# Patient Record
Sex: FEMALE | Race: WHITE | NOT HISPANIC OR LATINO | Employment: UNEMPLOYED | URBAN - METROPOLITAN AREA
[De-identification: names, ages, dates, MRNs, and addresses within clinical notes are randomized per-mention and may not be internally consistent; named-entity substitution may affect disease eponyms.]

---

## 2022-11-10 ENCOUNTER — OFFICE VISIT (OUTPATIENT)
Dept: FAMILY MEDICINE CLINIC | Facility: CLINIC | Age: 22
End: 2022-11-10

## 2022-11-10 VITALS
OXYGEN SATURATION: 99 % | SYSTOLIC BLOOD PRESSURE: 118 MMHG | BODY MASS INDEX: 35.62 KG/M2 | HEIGHT: 66 IN | WEIGHT: 221.6 LBS | DIASTOLIC BLOOD PRESSURE: 74 MMHG | HEART RATE: 88 BPM

## 2022-11-10 DIAGNOSIS — M54.9 MID BACK PAIN, CHRONIC: Primary | ICD-10-CM

## 2022-11-10 DIAGNOSIS — G89.29 MID BACK PAIN, CHRONIC: Primary | ICD-10-CM

## 2022-11-10 PROBLEM — F90.9 ADHD (ATTENTION DEFICIT HYPERACTIVITY DISORDER): Status: ACTIVE | Noted: 2022-11-10

## 2022-11-10 PROBLEM — F43.10 PTSD (POST-TRAUMATIC STRESS DISORDER): Status: ACTIVE | Noted: 2019-09-12

## 2022-11-10 PROBLEM — Z84.1 FAMILY HISTORY OF RENAL FAILURE: Status: ACTIVE | Noted: 2019-10-15

## 2022-11-10 PROBLEM — Z83.3 FAMILY HISTORY OF DIABETES MELLITUS (DM): Status: ACTIVE | Noted: 2019-09-12

## 2022-11-10 PROCEDURE — 99203 OFFICE O/P NEW LOW 30 MIN: CPT | Performed by: NURSE PRACTITIONER

## 2022-11-10 RX ORDER — METHOCARBAMOL 500 MG/1
500 TABLET, FILM COATED ORAL 4 TIMES DAILY
Qty: 30 TABLET | Refills: 0 | Status: SHIPPED | OUTPATIENT
Start: 2022-11-10 | End: 2023-02-01

## 2022-11-10 NOTE — PROGRESS NOTES
"Chief Complaint  Back Pain and Breast Pain (Pt states she is having pain in both of her breast and she believes that's whats causing her back pain)    Subjective        Kyara Christianson presents to [facility]  History of Present Illness   Pt is here today with concerns of back pain that has been a concern for a couple of years. She states her breasts have gotten larger and she feels that is causing her pain. She is a 44DDD currently, but feels she needs to go to larger size bra. The pain is in the mid upper back between the shoulder blades. 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 She is experiencing some breast pain. She had a breast exam a couple of months ago and no abnormalities were found. Pt states she has considered a breast reduction. We discussed PT to start. She will follow up in 3 months and we will re-evaluate.   Back Pain  This is a chronic problem. The current episode started more than 1 year ago. The problem occurs constantly. The problem has been gradually worsening since onset. The quality of the pain is described as aching and cramping. The pain does not radiate. The pain is worse during the night. Exacerbated by: lifting. She has tried heat (massage unit) for the symptoms. The treatment provided mild relief.   Breast Pain  This is a new problem. The current episode started more than 1 month ago. The problem occurs intermittently. Nothing aggravates the symptoms. She has tried heat for the symptoms.       Objective   Vital Signs:  /74   Pulse 88   Ht 167.6 cm (66\")   Wt 101 kg (221 lb 9.6 oz)   SpO2 99%   BMI 35.77 kg/m²   Estimated body mass index is 35.77 kg/m² as calculated from the following:    Height as of this encounter: 167.6 cm (66\").    Weight as of this encounter: 101 kg (221 lb 9.6 oz).          Physical Exam  Vitals reviewed.   Constitutional:       Appearance: Normal appearance.   HENT:      Nose: Nose " normal.      Mouth/Throat:      Mouth: Mucous membranes are moist.      Pharynx: Oropharynx is clear.   Eyes:      Conjunctiva/sclera: Conjunctivae normal.   Cardiovascular:      Rate and Rhythm: Normal rate and regular rhythm.      Heart sounds: Normal heart sounds.   Pulmonary:      Effort: Pulmonary effort is normal.      Breath sounds: Normal breath sounds.   Musculoskeletal:         General: Tenderness present. Normal range of motion.      Thoracic back: Tenderness present. Normal range of motion.   Neurological:      Mental Status: She is alert and oriented to person, place, and time.   Psychiatric:         Mood and Affect: Mood normal.         Behavior: Behavior normal.         Thought Content: Thought content normal.        Result Review :                Assessment and Plan   Diagnoses and all orders for this visit:    1. Mid back pain, chronic (Primary)  -     methocarbamol (Robaxin) 500 MG tablet; Take 1 tablet by mouth 4 (Four) Times a Day.  Dispense: 30 tablet; Refill: 0  -     Ambulatory Referral to Physical Therapy Evaluate and treat             Follow Up   Return in about 3 months (around 2/10/2023) for Recheck, Annual.  Patient was given instructions and counseling regarding her condition or for health maintenance advice. Please see specific information pulled into the AVS if appropriate.

## 2023-01-24 ENCOUNTER — HOSPITAL ENCOUNTER (OUTPATIENT)
Dept: PHYSICAL THERAPY | Facility: HOSPITAL | Age: 23
Setting detail: THERAPIES SERIES
Discharge: HOME | End: 2023-01-24
Payer: COMMERCIAL

## 2023-01-24 DIAGNOSIS — G89.29 CHRONIC BILATERAL THORACIC BACK PAIN: Primary | ICD-10-CM

## 2023-01-24 DIAGNOSIS — M54.6 CHRONIC BILATERAL THORACIC BACK PAIN: Primary | ICD-10-CM

## 2023-01-24 PROCEDURE — 97161 PT EVAL LOW COMPLEX 20 MIN: CPT | Performed by: GENERAL ACUTE CARE HOSPITAL

## 2023-01-24 NOTE — THERAPY EVALUATION
Outpatient Physical Therapy Ortho Initial Evaluation  [facility]     Patient Name: Kyara Christianson  : 2000  MRN: [id number]  Today's Date: 2023      Visit Date: 2023    Patient Active Problem List   Diagnosis   • ADHD (attention deficit hyperactivity disorder)   • Asthma   • Family history of diabetes mellitus (DM)   • Family history of renal failure   • Overweight   • PTSD (post-traumatic stress disorder)        No past medical history on file.     No past surgical history on file.    Visit Dx:     ICD-10-CM ICD-9-CM   1. Chronic bilateral thoracic back pain  M54.6 724.1    G89.29 338.29          Patient History     Row Name 23 1345             History    Chief Complaint Difficulty with daily activities;Joint stiffness;Muscle weakness;Pain  -HP      Type of Pain Back pain  -HP      Date Current Problem(s) Began --  Chronic in nature  -HP      Brief Description of Current Complaint Patient states that she has had chronic in nature low back and mid back pain.  She attributes this pain to her increased sized breast.  For this reason, patient is seeking a breast reduction which requires physical therapy in order to receive approval by her insurance.  -HP      Patient/Caregiver Goals Relieve pain;Return to prior level of function;Improve mobility;Know what to do to help the symptoms  -HP      Hand Dominance right-handed  -HP      Occupation/sports/leisure activities  employee  -HP         Pain     Pain Location Back  -HP      Pain at Present 7  -HP      Pain at Best 7  -HP      Pain at Worst 10  -HP      Pain Frequency Constant/continuous  -HP      Pain Description Aching;Cramping;Discomfort;Sharp;Shooting;Tightness  -HP      Is your sleep disturbed? Yes  -HP      Difficulties at work? yes  -HP      Difficulties with ADL's? yes  -HP      Difficulties with recreational activities? yes  -HP         Fall Risk Assessment    Any falls in the past year: No  -HP         Daily Activities     Primary Language English  -      Pt Participated in POC and Goals Yes  -HP            User Key  (r) = Recorded By, (t) = Taken By, (c) = Cosigned By    Initials Name Provider Type    Mike Persaud PT Physical Therapist                 PT Ortho     Row Name 01/24/23 7351       Precautions and Contraindications    Precautions/Limitations no known precautions/limitations  -HP       Posture/Observations    Alignment Options Rounded shoulders  -HP    Rounded Shoulders Moderate  -HP    Posture/Observations Comments Patient with moderate TTP along the parascapular musculature and thoracic region musculature  -       Quarter Clearing    Quarter Clearing Upper Quarter Clearing  -       Sensory Screen for Light Touch- Upper Quarter Clearing    C4 (posterior shoulder) Intact  -HP    C5 (lateral upper arm) Intact  -HP    C6 (tip of thumb) Intact  -HP    C7 (tip of 3rd finger) Intact  -HP    C8 (tip of 5th finger) Intact  -HP    T1 (medial lower arm) Intact  -HP       General ROM    GENERAL ROM COMMENTS No active range of motion deficits noted in the upper extremities.  -HP       MMT (Manual Muscle Testing)    General MMT Comments WNL MMT of the upper extremities  -HP          User Key  (r) = Recorded By, (t) = Taken By, (c) = Cosigned By    Initials Name Provider Type    Mike Persaud PT Physical Therapist                            Therapy Education  Education Details: HEP included: Band pull-apart's, band no money, doorway stretch, side-lying open books  Given: HEP, Symptoms/condition management, Pain management, Posture/body mechanics  Program: New  How Provided: Verbal, Demonstration, Written  Provided to: Patient  Level of Understanding: Teach back education performed, Verbalized, Demonstrated      PT OP Goals     Row Name 01/24/23 1343          PT Short Term Goals    STG Date to Achieve 02/21/23  -     STG 1 Patient to report improvement of symptoms by 50% or greater.  -     STG 1 Progress New  -      STG 2 Patient to report adherence to HEP.  -HP     STG 2 Progress New  -HP     STG 3 Patient to report a 10% decrease in her modified Oswestry score compared to her initial evaluation.  -HP     STG 3 Progress New  -HP        Long Term Goals    LTG Date to Achieve 03/21/23  -HP     LTG 1 Patient to report improvement of symptoms by 75% or greater.  -HP     LTG 1 Progress New  -HP     LTG 2 Patient to report independence with HEP.  -HP     LTG 2 Progress New  -HP     LTG 3 Patient to report a 20% decrease in modified Oswestry score compared to her initial evaluation  -HP     LTG 3 Progress New  -HP        Time Calculation    PT Goal Re-Cert Due Date 04/24/23  -HP           User Key  (r) = Recorded By, (t) = Taken By, (c) = Cosigned By    Initials Name Provider Type    Mike Persaud, PT Physical Therapist                 PT Assessment/Plan     Row Name 01/24/23 5359          PT Assessment    Functional Limitations Performance in leisure activities;Performance in work activities  -     Impairments Pain;Range of motion;Poor body mechanics;Posture  -     Assessment Comments Patient is a 22-year-old female that presents with a low complex and evolving case of chronic mid back and low back pain.  Patient states that for several years she has had increased pain which she attributes to her breast size.  For this reason, patient is seeking a breast reduction.  Upon evaluation, patient has noticeable tenderness to palpation along the parascapular and paravertebral musculature of the thoracic spine.  Skilled therapy is required in order to address areas of weakness and improve patient's overall functional mobility.  -HP     Please refer to paper survey for additional self-reported information Yes  -HP     Rehab Potential Good  -HP     Patient/caregiver participated in establishment of treatment plan and goals Yes  -HP     Patient would benefit from skilled therapy intervention Yes  -HP        PT Plan    PT Frequency  1x/week  -     Predicted Duration of Therapy Intervention (PT) 6 visits  -     Planned CPT's? PT EVAL LOW COMPLEXITY: 20135;PT THER PROC EA 15 MIN: 63499;PT THER ACT EA 15 MIN: 80705;PT MANUAL THERAPY EA 15 MIN: 05297  -     Physical Therapy Interventions (Optional Details) gross motor skills;home exercise program;manual therapy techniques;modalities;neuromuscular re-education;patient/family education;postural re-education;ROM (Range of Motion);strengthening;stretching  -     PT Plan Comments Patient to benefit from further skilled therapy with a focus on improving overall postural strength, decreasing pain, and improving overall functional ability.  -           User Key  (r) = Recorded By, (t) = Taken By, (c) = Cosigned By    Initials Name Provider Type     Mike Daly, PT Physical Therapist                                              Time Calculation:     Start Time: 1345  Untimed Charges  PT Eval/Re-eval Minutes: 60  Total Minutes  Untimed Charges Total Minutes: 60   Total Minutes: 60     Therapy Charges for Today     Code Description Service Date Service Provider Modifiers Qty    [id number]  PT EVAL LOW COMPLEXITY 4 1/24/2023 Mike Daly, PT GP 1                    Mike Daly PT  1/24/2023

## 2023-01-30 ENCOUNTER — OFFICE VISIT (OUTPATIENT)
Dept: FAMILY MEDICINE CLINIC | Facility: CLINIC | Age: 23
End: 2023-01-30
Payer: COMMERCIAL

## 2023-01-30 VITALS
SYSTOLIC BLOOD PRESSURE: 122 MMHG | DIASTOLIC BLOOD PRESSURE: 80 MMHG | BODY MASS INDEX: 36.19 KG/M2 | HEART RATE: 95 BPM | HEIGHT: 66 IN | OXYGEN SATURATION: 98 % | WEIGHT: 225.2 LBS

## 2023-01-30 DIAGNOSIS — M25.532 LEFT WRIST PAIN: Primary | ICD-10-CM

## 2023-01-30 PROCEDURE — 99213 OFFICE O/P EST LOW 20 MIN: CPT | Performed by: NURSE PRACTITIONER
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
## 2023-02-01 ENCOUNTER — OFFICE VISIT (OUTPATIENT)
Dept: FAMILY MEDICINE CLINIC | Facility: CLINIC | Age: 23
End: 2023-02-01
Payer: COMMERCIAL

## 2023-02-01 VITALS
OXYGEN SATURATION: 98 % | DIASTOLIC BLOOD PRESSURE: 64 MMHG | RESPIRATION RATE: 14 BRPM | TEMPERATURE: 98.7 F | HEART RATE: 107 BPM | BODY MASS INDEX: 36.42 KG/M2 | WEIGHT: 226.6 LBS | SYSTOLIC BLOOD PRESSURE: 118 MMHG | HEIGHT: 66 IN

## 2023-02-01 DIAGNOSIS — M25.532 LEFT WRIST PAIN: ICD-10-CM

## 2023-02-01 DIAGNOSIS — M54.9 CHRONIC BACK PAIN, UNSPECIFIED BACK LOCATION, UNSPECIFIED BACK PAIN LATERALITY: Primary | ICD-10-CM

## 2023-02-01 DIAGNOSIS — G89.29 CHRONIC BACK PAIN, UNSPECIFIED BACK LOCATION, UNSPECIFIED BACK PAIN LATERALITY: Primary | ICD-10-CM

## 2023-02-01 PROCEDURE — 99214 OFFICE O/P EST MOD 30 MIN: CPT | Performed by: STUDENT IN AN ORGANIZED HEALTH CARE EDUCATION/TRAINING PROGRAM

## 2023-02-01 NOTE — PROGRESS NOTES
New Patient Office Visit      Patient Name: Kyara Christianson  : 2000   MRN: [id number]     Chief Complaint:  Pain (Left wrist, est care)     History of Present Illness:       Left wrist pain  Started 2.5 weeks ago.   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 She has some numbness in her left hand. No burning or tingling. She says the symptoms stop at the knuckles.   No rash.         Back pain  She has been to physical therapy and plans to continue pt.   This has been going on for years.    The pain is in her thoracic spine .   She feels large breasts are contributing  She has tried a back brace for this as well.   No numbness tingling or burning in her back but she says it is a sharp pain that is worse with sitting and slumping over.       Subjective        Past Medical History:   Diagnosis Date   • Asthma     All my childhood.       History reviewed. No pertinent surgical history.    Family History   Problem Relation Age of Onset   • Hypertension Mother    • Mental illness Mother    • Diabetes Mother    • Kidney failure Mother    • Depression Mother    • Drug abuse Mother    • Migraines Sister        Social History     Socioeconomic History   • Marital status:    Tobacco Use   • Smoking status: Never   • Smokeless tobacco: Never   Substance and Sexual Activity   • Alcohol use: Yes     Alcohol/week: 1.0 standard drink     Types: 1 Glasses of wine per week   • Drug use: Never   • Sexual activity: Yes     Partners: Female     Birth control/protection: Same-sex partner        No current outpatient medications on file.    No Known Allergies    Objective     Physical Exam:  Vitals:    23 1351   BP: 118/64   Pulse: 107   Resp: 14   Temp: 98.7 °F  "(37.1 °C)   SpO2: 98%   Weight: 103 kg (226 lb 9.6 oz)   Height: 167.6 cm (65.98\")   PainSc:   7      Body mass index is 36.6 kg/m².     Physical Exam  Constitutional:       General: She is not in acute distress.     Appearance: Normal appearance.   HENT:      Head: Normocephalic and atraumatic.   Eyes:      Extraocular Movements: Extraocular movements intact.   Cardiovascular:      Rate and Rhythm: Normal rate and regular rhythm.      Heart sounds: No murmur heard.  Pulmonary:      Effort: Pulmonary effort is normal. No respiratory distress.      Breath sounds: Normal breath sounds. No stridor. No wheezing, rhonchi or rales.   Musculoskeletal:      Comments: Pain present paraspinal muscles to the right of thoracic spine.  Normal gait  Upper extremities have normal  strength rom muscle strength sensations and pulses  phalens test is positive on the left     Skin:     Findings: No rash.   Neurological:      General: No focal deficit present.      Mental Status: She is alert.   Psychiatric:         Mood and Affect: Mood normal.              Assessment / Plan      Assessment/Plan:   Diagnoses and all orders for this visit:    1. Chronic back pain, unspecified back location, unspecified back pain laterality (Primary)  -     XR Spine Thoracic 3 View (In Office)    2. Left wrist pain  -     XR Wrist 3+ View Left (In Office)       Continue with ibuprofen and pt for chronic back pain. She will consider breast reduction after physical therapy.     For left wrist pain will refer her to orthopedics. Nerve conduction study is ordered per her previous pcp and she has this scheduled. Advised her to let me know when this has resulted and I will look at the result. Will check xray here in office. Continue with rest ice and ibuprofen.         Return in about 2 months (around 4/1/2023) for Annual.       Janice Rasheed D.O.  [facility] [place]     "

## 2023-02-06 ENCOUNTER — OFFICE VISIT (OUTPATIENT)
Dept: ORTHOPEDIC SURGERY | Facility: CLINIC | Age: 23
End: 2023-02-06
Payer: COMMERCIAL

## 2023-02-06 VITALS
SYSTOLIC BLOOD PRESSURE: 120 MMHG | HEIGHT: 66 IN | WEIGHT: 227.07 LBS | BODY MASS INDEX: 36.49 KG/M2 | DIASTOLIC BLOOD PRESSURE: 68 MMHG

## 2023-02-06 DIAGNOSIS — M65.831 EXTENSOR INTERSECTION SYNDROME OF RIGHT WRIST: Primary | ICD-10-CM

## 2023-02-06 DIAGNOSIS — E66.9 OBESITY (BMI 30-39.9): ICD-10-CM

## 2023-02-06 DIAGNOSIS — G56.01 CARPAL TUNNEL SYNDROME OF RIGHT WRIST: ICD-10-CM

## 2023-02-06 PROCEDURE — 99204 OFFICE O/P NEW MOD 45 MIN: CPT | Performed by: STUDENT IN AN ORGANIZED HEALTH CARE EDUCATION/TRAINING PROGRAM

## 2023-02-06 RX ORDER — METHYLPREDNISOLONE 4 MG/1
TABLET ORAL
Qty: 21 TABLET | Refills: 0 | Status: SHIPPED | OUTPATIENT
Start: 2023-02-06

## 2023-02-06 NOTE — PROGRESS NOTES
[facility] [place] [facility] Office Visit     Office Visit       Date: 02/06/2023   Patient Name: Kyara Christianson  MRN: [id number]  YOB: 2000    Referring Physician: Janice Rasheed DO     Chief Complaint:   Chief Complaint   Patient presents with   • Left Wrist - Pain       History of Present Illness:   Kyara Christianson is a 22 y.o. female who presents with new problem of: left wrist pain.  Onset: atraumatic and gradual in nature. The issue has been ongoing for 3 year(s). Pain is a 7/10 on the pain scale. Pain is described as throbbing and shooting. Associated symptoms include pain and giving way/buckling. The pain is worse with any movement of the joint; heat and pain medication and/or NSAID improve the pain. Previous treatments have included: bracing and NSAIDS.    Subjective   Review of Systems: Review of Systems   Constitutional: Negative for chills, fever, unexpected weight gain and unexpected weight loss.   HENT: Negative for congestion, postnasal drip and rhinorrhea.    Eyes: Negative for blurred vision.   Respiratory: Negative for shortness of breath.    Cardiovascular: Negative for leg swelling.   Gastrointestinal: Negative for abdominal pain, nausea and vomiting.   Genitourinary: Negative for difficulty urinating.   Musculoskeletal: Positive for arthralgias. Negative for gait problem, joint swelling and myalgias.   Skin: Negative for skin lesions and wound.   Neurological: Negative for dizziness, weakness, light-headedness and numbness.   Hematological: Does not bruise/bleed easily.   Psychiatric/Behavioral: Negative for depressed mood.        I have reviewed the following portions of the patient's history:History of Present Illness and review of systems.    Past Medical History:   Past Medical History:   Diagnosis Date   • Arthritis of back    • Asthma     All my childhood.   • CTS (carpal tunnel syndrome)     Possibly?   • Fracture of wrist        Past  "Surgical History: No past surgical history on file.    Family History:   Family History   Problem Relation Age of Onset   • Hypertension Mother    • Mental illness Mother    • Diabetes Mother    • Kidney failure Mother    • Depression Mother    • Drug abuse Mother    • Migraines Sister        Social History:   Social History     Socioeconomic History   • Marital status:    Tobacco Use   • Smoking status: Never   • Smokeless tobacco: Never   Substance and Sexual Activity   • Alcohol use: Yes   • Drug use: Never   • Sexual activity: Yes     Partners: Female     Birth control/protection: Same-sex partner       Medications:   Current Outpatient Medications:   •  methylPREDNISolone (MEDROL) 4 MG dose pack, Take as directed on package instructions., Disp: 21 tablet, Rfl: 0    Allergies: No Known Allergies    I reviewed the patient's chief complaint, history of present illness, review of systems, past medical history, surgical history, family history, social history, medications and allergy list.     Objective    Vital Signs:   Vitals:    02/06/23 1023   BP: 120/68   Weight: 103 kg (227 lb 1.2 oz)   Height: 167.6 cm (65.98\")     Body mass index is 36.67 kg/m².   Class 2 Severe Obesity (BMI >=35 and <=39.9). Obesity-related health conditions include the following: hypertension, coronary heart disease, diabetes mellitus and dyslipidemias. Obesity is newly identified. BMI is is above average; BMI management plan is completed. We discussed portion control and increasing exercise.     Patient reports that she is a non-smoker and has not ever been a smoker.  This behavior was applauded and she was encouraged to continue in smoking cessation.  We will continue to monitor at subsequent visits.    Ortho Exam:  General: Well-appearing, no acute distress  Right wrist: Tender over the dorsum of the right wrist.  No swelling or bruising.  Worsened by wrist extension.  Tender over intersection of 1st and 2nd dorsal compartments.  " Normal range of motion but with pain.  Negative Finkelstein test.  2+ radial pulse.  Sensation intact to light touch.  right hand:  Normal motor exam - no wasting of thenar eminence, normal pincer grasp. Normal sensation.   + tinel sign. + phalen sign. + compression test.     Results Review:   Imaging Results (Last 24 Hours)     ** No results found for the last 24 hours. **      XR Wrist 3+ View Left (In Office) (02/01/2023 14:18)  I personally reviewed radiographs of the left wrist.  No acute fracture or dislocation.  No significant degenerative change noted.    Procedures    Assessment / Plan    Assessment/Plan:   Diagnoses and all orders for this visit:    1. 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 Carpal tunnel syndrome of right wrist  -     methylPREDNISolone (MEDROL) 4 MG dose pack; Take as directed on package instructions.  Dispense: 21 tablet; Refill: 0  -     Ambulatory Referral to Physical Therapy Evaluate and treat, Ortho; Electrotherapy; E-stim, Iontophoresis; Soft Tissue Mobilizaton; Stretching, ROM, Strengthening    3. Obesity (BMI 30-39.9)      3 years of intermittent but now worsening right wrist pain.  She is tender in multiple locations.  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  Has tenderness over her extensor tendons of the dorsal compartment.  Radiographs negative.  History points towards overuse injury from intersection syndrome.  However, she also has volar aspect numbness and tingling in addition to mild pain through the distribution of the median nerve.  I discussed with her my findings of both intersection syndrome and carpal tunnel.  She has been in a brace that think is appropriate.  I would have her continue wearing this.  She is scheduled in the coming weeks for EMG for further  evaluation of the median nerve pathology.  I will start her today on a Medrol Dosepak to control her pain.  Also get her into hand therapy for additional treatments.  Plan to see her back after the EMG to discuss results and the next Epson treatment.    Past documentation reviewed: NAZ Tolbert-1/30/2023-office visit.  Janice Rasheed DO-office visit-2/1/2023.    Past laboratory results review: 9/28/2020-hemoglobin A1c 5.0%.    Past imaging studies reviewed: 3 views of the left wrist-2/1/2023.    Follow Up:   No follow-ups on file.      Parveen Mora MD  [facility]

## 2023-02-28 ENCOUNTER — DOCUMENTATION (OUTPATIENT)
Dept: PHYSICAL THERAPY | Facility: HOSPITAL | Age: 23
End: 2023-02-28
Payer: COMMERCIAL

## 2023-02-28 DIAGNOSIS — M54.6 CHRONIC BILATERAL THORACIC BACK PAIN: Primary | ICD-10-CM

## 2023-02-28 DIAGNOSIS — G89.29 CHRONIC BILATERAL THORACIC BACK PAIN: Primary | ICD-10-CM

## 2023-02-28 NOTE — THERAPY DISCHARGE NOTE
Outpatient Physical Therapy Discharge Summary         Patient Name: Kyara Christianson  : 2000  MRN: [id number]    Today's Date: 2023    Visit Dx:    ICD-10-CM ICD-9-CM   1. Chronic bilateral thoracic back pain  M54.6 724.1    G89.29 338.29           OP PT Discharge Summary  Date of Discharge: 23  Discharge Instructions/Additional Comments: Patient did not show for 3 visits and did not call to reschedule.  For this reason and per clinic's policy patient to be discharged at this time.      Time Calculation:                    Mike Daly, PT  2023

## 2024-03-15 ENCOUNTER — APPOINTMENT (OUTPATIENT)
Dept: CT IMAGING | Facility: HOSPITAL | Age: 24
End: 2024-03-15
Payer: COMMERCIAL

## 2024-03-15 ENCOUNTER — HOSPITAL ENCOUNTER (EMERGENCY)
Facility: HOSPITAL | Age: 24
Discharge: HOME | End: 2024-03-15
Attending: EMERGENCY MEDICINE
Payer: COMMERCIAL

## 2024-03-15 ENCOUNTER — APPOINTMENT (OUTPATIENT)
Dept: GENERAL RADIOLOGY | Facility: HOSPITAL | Age: 24
End: 2024-03-15
Payer: COMMERCIAL

## 2024-03-15 VITALS
RESPIRATION RATE: 18 BRPM | DIASTOLIC BLOOD PRESSURE: 61 MMHG | BODY MASS INDEX: 36.16 KG/M2 | SYSTOLIC BLOOD PRESSURE: 110 MMHG | OXYGEN SATURATION: 95 % | WEIGHT: 225 LBS | HEIGHT: 66 IN | HEART RATE: 74 BPM | TEMPERATURE: 98 F
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
## 2024-03-15 DIAGNOSIS — M54.50 ACUTE BILATERAL LOW BACK PAIN WITHOUT SCIATICA: ICD-10-CM

## 2024-03-15 DIAGNOSIS — S16.1XXA STRAIN OF NECK MUSCLE, INITIAL ENCOUNTER: ICD-10-CM

## 2024-03-15 LAB
ALBUMIN SERPL-MCNC: 4.2 G/DL (ref 3.5–5.2)
ALBUMIN/GLOB SERPL: 1.4 G/DL
ALP SERPL-CCNC: 58 U/L (ref 39–117)
ALT SERPL W P-5'-P-CCNC: 15 U/L (ref 1–33)
ANION GAP SERPL CALCULATED.3IONS-SCNC: 12.3 MMOL/L (ref 5–15)
AST SERPL-CCNC: 17 U/L (ref 1–32)
BASOPHILS # BLD AUTO: 0.08 10*3/MM3 (ref 0–0.2)
BASOPHILS NFR BLD AUTO: 1 % (ref 0–1.5)
BILIRUB SERPL-MCNC: 0.4 MG/DL (ref 0–1.2)
BUN SERPL-MCNC: 11 MG/DL (ref 6–20)
BUN/CREAT SERPL: 14.1 (ref 7–25)
CALCIUM SPEC-SCNC: 8.7 MG/DL (ref 8.6–10.5)
CHLORIDE SERPL-SCNC: 105 MMOL/L (ref 98–107)
CO2 SERPL-SCNC: 21.7 MMOL/L (ref 22–29)
CREAT SERPL-MCNC: 0.78 MG/DL (ref 0.57–1)
DEPRECATED RDW RBC AUTO: 38.6 FL (ref 37–54)
EGFRCR SERPLBLD CKD-EPI 2021: 110.3 ML/MIN/1.73
EOSINOPHIL # BLD AUTO: 0.12 10*3/MM3 (ref 0–0.4)
EOSINOPHIL NFR BLD AUTO: 1.5 % (ref 0.3–6.2)
ERYTHROCYTE [DISTWIDTH] IN BLOOD BY AUTOMATED COUNT: 12 % (ref 12.3–15.4)
GLOBULIN UR ELPH-MCNC: 3 GM/DL
GLUCOSE SERPL-MCNC: 98 MG/DL (ref 65–99)
HCG INTACT+B SERPL-ACNC: <0.5 MIU/ML
HCT VFR BLD AUTO: 40.7 % (ref 34–46.6)
HGB BLD-MCNC: 14.3 G/DL (ref 12–15.9)
IMM GRANULOCYTES # BLD AUTO: 0.03 10*3/MM3 (ref 0–0.05)
IMM GRANULOCYTES NFR BLD AUTO: 0.4 % (ref 0–0.5)
LYMPHOCYTES # BLD AUTO: 3.2 10*3/MM3 (ref 0.7–3.1)
LYMPHOCYTES NFR BLD AUTO: 40 % (ref 19.6–45.3)
MCH RBC QN AUTO: 30.8 PG (ref 26.6–33)
MCHC RBC AUTO-ENTMCNC: 35.1 G/DL (ref 31.5–35.7)
MCV RBC AUTO: 87.5 FL (ref 79–97)
MONOCYTES # BLD AUTO: 0.54 10*3/MM3 (ref 0.1–0.9)
MONOCYTES NFR BLD AUTO: 6.7 % (ref 5–12)
NEUTROPHILS NFR BLD AUTO: 4.04 10*3/MM3 (ref 1.7–7)
NEUTROPHILS NFR BLD AUTO: 50.4 % (ref 42.7–76)
NRBC BLD AUTO-RTO: 0 /100 WBC (ref 0–0.2)
PLATELET # BLD AUTO: 302 10*3/MM3 (ref 140–450)
PMV BLD AUTO: 11.1 FL (ref 6–12)
POTASSIUM SERPL-SCNC: 3.3 MMOL/L (ref 3.5–5.2)
PROT SERPL-MCNC: 7.2 G/DL (ref 6–8.5)
QT INTERVAL: 392 MS
QTC INTERVAL: 426 MS
RBC # BLD AUTO: 4.65 10*6/MM3 (ref 3.77–5.28)
SODIUM SERPL-SCNC: 139 MMOL/L (ref 136–145)
WBC NRBC COR # BLD AUTO: 8.01 10*3/MM3 (ref 3.4–10.8)

## 2024-03-15 PROCEDURE — 96374 THER/PROPH/DIAG INJ IV PUSH: CPT

## 2024-03-15 PROCEDURE — 71260 CT THORAX DX C+: CPT

## 2024-03-15 PROCEDURE — 93010 ELECTROCARDIOGRAM REPORT: CPT | Performed by: INTERNAL MEDICINE

## 2024-03-15 PROCEDURE — 93005 ELECTROCARDIOGRAM TRACING: CPT | Performed by: EMERGENCY MEDICINE

## 2024-03-15 PROCEDURE — 25010000002 PROCHLORPERAZINE 10 MG/2ML SOLUTION: Performed by: EMERGENCY MEDICINE

## 2024-03-15 PROCEDURE — 74177 CT ABD & PELVIS W/CONTRAST: CPT

## 2024-03-15 PROCEDURE — 84702 CHORIONIC GONADOTROPIN TEST: CPT | Performed by: EMERGENCY MEDICINE

## 2024-03-15 PROCEDURE — 25010000002 HYDROMORPHONE 1 MG/ML SOLUTION: Performed by: EMERGENCY MEDICINE

## 2024-03-15 PROCEDURE — 99285 EMERGENCY DEPT VISIT HI MDM: CPT

## 2024-03-15 PROCEDURE — 72125 CT NECK SPINE W/O DYE: CPT

## 2024-03-15 PROCEDURE — 25510000001 IOPAMIDOL PER 1 ML: Performed by: EMERGENCY MEDICINE

## 2024-03-15 PROCEDURE — 93005 ELECTROCARDIOGRAM TRACING: CPT

## 2024-03-15 PROCEDURE — 85025 COMPLETE CBC W/AUTO DIFF WBC: CPT | Performed by: EMERGENCY MEDICINE

## 2024-03-15 PROCEDURE — 96375 TX/PRO/DX INJ NEW DRUG ADDON: CPT

## 2024-03-15 PROCEDURE — 25010000002 ONDANSETRON PER 1 MG: Performed by: EMERGENCY MEDICINE

## 2024-03-15 PROCEDURE — 36415 COLL VENOUS BLD VENIPUNCTURE: CPT

## 2024-03-15 PROCEDURE — 80053 COMPREHEN METABOLIC PANEL: CPT | Performed by: EMERGENCY MEDICINE

## 2024-03-15 RX ORDER — ONDANSETRON 2 MG/ML
4 INJECTION INTRAMUSCULAR; INTRAVENOUS ONCE
Status: COMPLETED | OUTPATIENT
Start: 2024-03-15 | End: 2024-03-15

## 2024-03-15 RX ORDER — PROCHLORPERAZINE EDISYLATE 5 MG/ML
10 INJECTION INTRAMUSCULAR; INTRAVENOUS ONCE
Status: COMPLETED | OUTPATIENT
Start: 2024-03-15 | End: 2024-03-15

## 2024-03-15 RX ADMIN — IOPAMIDOL 100 ML: 755 INJECTION, SOLUTION INTRAVENOUS at 09:44

## 2024-03-15 RX ADMIN — HYDROMORPHONE HYDROCHLORIDE 1 MG: 1 INJECTION, SOLUTION INTRAMUSCULAR; INTRAVENOUS; SUBCUTANEOUS at 09:14

## 2024-03-15 RX ADMIN — PROCHLORPERAZINE EDISYLATE 10 MG: 5 INJECTION INTRAMUSCULAR; INTRAVENOUS at 10:15

## 2024-03-15 RX ADMIN — ONDANSETRON 4 MG: 2 INJECTION INTRAMUSCULAR; INTRAVENOUS at 09:12

## 2024-03-15 NOTE — SIGNIFICANT NOTE
03/15/24 1154   Plan   Plan Comments SW sent referral to [facility] per provider request.
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
## 2024-04-05 ENCOUNTER — OFFICE VISIT (OUTPATIENT)
Dept: FAMILY MEDICINE CLINIC | Facility: CLINIC | Age: 24
End: 2024-04-05
Payer: COMMERCIAL

## 2024-04-05 ENCOUNTER — HOSPITAL ENCOUNTER (OUTPATIENT)
Dept: GENERAL RADIOLOGY | Facility: HOSPITAL | Age: 24
Discharge: HOME | End: 2024-04-05
Payer: COMMERCIAL

## 2024-04-05 VITALS
SYSTOLIC BLOOD PRESSURE: 127 MMHG | WEIGHT: 228.8 LBS | BODY MASS INDEX: 42.1 KG/M2 | OXYGEN SATURATION: 99 % | HEART RATE: 75 BPM | HEIGHT: 62 IN | DIASTOLIC BLOOD PRESSURE: 75 MMHG

## 2024-04-05 DIAGNOSIS — M25.511 ACUTE PAIN OF RIGHT SHOULDER: ICD-10-CM
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
## 2024-04-05 DIAGNOSIS — M25.511 ACUTE PAIN OF RIGHT SHOULDER: Primary | ICD-10-CM

## 2024-04-05 PROCEDURE — 73030 X-RAY EXAM OF SHOULDER: CPT

## 2024-04-05 PROCEDURE — 99204 OFFICE O/P NEW MOD 45 MIN: CPT

## 2024-04-05 RX ORDER — ALBUTEROL SULFATE 90 UG/1
2 AEROSOL, METERED RESPIRATORY (INHALATION) EVERY 6 HOURS PRN
COMMUNITY

## 2024-04-05 RX ORDER — METHOCARBAMOL 500 MG/1
500 TABLET, FILM COATED ORAL 4 TIMES DAILY
Qty: 120 TABLET | Refills: 0 | Status: SHIPPED | OUTPATIENT
Start: 2024-04-05

## 2024-04-05 RX ORDER — LIDOCAINE 50 MG/G
1 PATCH TOPICAL EVERY 24 HOURS
Qty: 30 EACH | Refills: 0 | Status: SHIPPED | OUTPATIENT
Start: 2024-04-05

## 2024-04-19 ENCOUNTER — OFFICE VISIT (OUTPATIENT)
Dept: FAMILY MEDICINE CLINIC | Facility: CLINIC | Age: 24
End: 2024-04-19
Payer: COMMERCIAL

## 2024-04-19 ENCOUNTER — PATIENT ROUNDING (BHMG ONLY) (OUTPATIENT)
Dept: FAMILY MEDICINE CLINIC | Facility: CLINIC | Age: 24
End: 2024-04-19

## 2024-04-19 VITALS
OXYGEN SATURATION: 100 % | SYSTOLIC BLOOD PRESSURE: 127 MMHG | WEIGHT: 226.8 LBS | HEIGHT: 62 IN | DIASTOLIC BLOOD PRESSURE: 76 MMHG | HEART RATE: 98 BPM | BODY MASS INDEX: 41.73 KG/M2

## 2024-04-19 DIAGNOSIS — Z00.00 ANNUAL PHYSICAL EXAM: Primary | ICD-10-CM

## 2024-04-19 DIAGNOSIS — Z13.220 SCREENING FOR LIPID DISORDERS: ICD-10-CM

## 2024-04-19 DIAGNOSIS — Z11.8 SCREENING FOR CHLAMYDIAL DISEASE: ICD-10-CM

## 2024-04-19 DIAGNOSIS — Z23 NEED FOR TDAP VACCINATION: ICD-10-CM

## 2024-04-19 DIAGNOSIS — Z11.59 NEED FOR HEPATITIS C SCREENING TEST: ICD-10-CM

## 2024-04-19 DIAGNOSIS — Z13.29 SCREENING FOR THYROID DISORDER: ICD-10-CM

## 2024-04-19 DIAGNOSIS — J45.20 MILD INTERMITTENT ASTHMA WITHOUT COMPLICATION: ICD-10-CM

## 2024-04-19 DIAGNOSIS — Z76.89 ENCOUNTER TO ESTABLISH CARE: ICD-10-CM

## 2024-04-19 DIAGNOSIS — J02.0 RECURRENT STREPTOCOCCAL PHARYNGITIS: ICD-10-CM

## 2024-04-19 RX ORDER — ALBUTEROL SULFATE 90 UG/1
2 AEROSOL, METERED RESPIRATORY (INHALATION) EVERY 6 HOURS PRN
Qty: 8 G | Refills: 2 | Status: SHIPPED | OUTPATIENT
Start: 2024-04-19

## 2024-04-19 NOTE — PROGRESS NOTES
Chief Complaint  Establish Care (/) and Annual Exam    SUBJECTIVE  Kyara Christianson presents to [facility]    History of Present Illness  Patient is a 23-year-old female who presents today to establish care.  Previous PCP unknown. She is due annual physical exam, to be done in office today.  Needs chronic condition management of asthma.     Asthma-reports she rarely has a flare.  Patient reports spring and summertime bothers her the most.  Patient is needing refill on her albuterol inhaler.  Denies any nighttime symptoms.    Patient is due for her first Pap smear.  She reports she will schedule with our office.    She is due TDAP and is agreeable to have done in office today.     Patient is due labs. Orders placed at today's visit. Discussed with patient that these are fasting labs.     No other concerns or complaints at this time.  Patient denies any diarrhea, constipation, blood in stool, urinary urgency, frequency, or dysuria, chest pain, shortness of breath or syncope.           Past Medical History:   Diagnosis Date    Arthritis of back     Asthma     All my childhood.    CTS (carpal tunnel syndrome)     Possibly?    Fracture of wrist       Family History   Problem Relation Age of Onset    Hypertension Mother     Mental illness Mother     Diabetes Mother     Kidney failure Mother     Depression Mother     Drug abuse Mother     Migraines Sister       History reviewed. No pertinent surgical history.     Current Outpatient Medications:     albuterol sulfate  (90 Base) MCG/ACT inhaler, Inhale 2 puffs Every 6 (Six) Hours As Needed for Wheezing or Shortness of Air., Disp: 8 g, Rfl: 2    diclofenac (VOLTAREN) 50 MG EC tablet, Take 1 tablet by mouth 2 (Two) Times a Day As Needed (pain)., Disp: 30 tablet, Rfl: 0    lidocaine (LIDODERM) 5 %, Place 1 patch on the skin as directed by provider Daily. Remove & Discard patch within 12 hours or as directed by MD, Disp: 30 each, Rfl:  "0    OBJECTIVE  Vital Signs:   /76   Pulse 98   Ht 157.5 cm (62\")   Wt 103 kg (226 lb 12.8 oz)   LMP 03/29/2024   SpO2 100%   BMI 41.48 kg/m²    Estimated body mass index is 41.48 kg/m² as calculated from the following:    Height as of this encounter: 157.5 cm (62\").    Weight as of this encounter: 103 kg (226 lb 12.8 oz).     Wt Readings from Last 3 Encounters:   04/19/24 103 kg (226 lb 12.8 oz)   04/05/24 104 kg (228 lb 12.8 oz)   03/15/24 102 kg (225 lb)     BP Readings from Last 3 Encounters:   04/19/24 127/76   04/05/24 127/75   03/15/24 110/61       Physical Exam  Vitals reviewed.   Constitutional:       General: She is awake. She is not in acute distress.     Appearance: She is well-developed and well-groomed. She is not ill-appearing.   HENT:      Head: Normocephalic and atraumatic.      Right Ear: Tympanic membrane, ear canal and external ear normal.      Left Ear: Tympanic membrane, ear canal and external ear normal.      Nose: Nose normal.      Mouth/Throat:      Mouth: Mucous membranes are moist.      Pharynx: Oropharynx is clear. No oropharyngeal exudate or posterior oropharyngeal erythema.   Eyes:      Extraocular Movements: Extraocular movements intact.      Conjunctiva/sclera: Conjunctivae normal.      Pupils: Pupils are equal, round, and reactive to light.   Neck:      Thyroid: No thyroid mass, thyromegaly or thyroid tenderness.   Cardiovascular:      Rate and Rhythm: Normal rate and regular rhythm.      Heart sounds: Normal heart sounds.   Pulmonary:      Effort: Pulmonary effort is normal.      Breath sounds: Normal breath sounds.   Abdominal:      General: Abdomen is flat. Bowel sounds are normal. There is no distension.      Palpations: Abdomen is soft.      Tenderness: There is no abdominal tenderness.   Musculoskeletal:         General: Normal range of motion.      Cervical back: Normal range of motion and neck supple. No rigidity or tenderness.   Lymphadenopathy:      Cervical: No " cervical adenopathy.   Skin:     General: Skin is warm and dry.   Neurological:      Mental Status: She is alert and oriented to person, place, and time.   Psychiatric:         Mood and Affect: Mood normal.         Behavior: Behavior normal. Behavior is cooperative.         Thought Content: Thought content normal.         Judgment: Judgment normal.          Result Review    Common labs          3/15/2024    09:15   Common Labs   Glucose 98    BUN 11    Creatinine 0.78    Sodium 139    Potassium 3.3    Chloride 105    Calcium 8.7    Albumin 4.2    Total Bilirubin 0.4    Alkaline Phosphatase 58    AST (SGOT) 17    ALT (SGPT) 15    WBC 8.01    Hemoglobin 14.3    Hematocrit 40.7    Platelets 302        XR Shoulder 2+ View Right    Result Date: 4/5/2024  Impression: Negative study   Electronically Signed By-Jonathon Mendez On:4/5/2024 8:26 AM      CT Abdomen Pelvis With Contrast    Result Date: 3/15/2024   Negative CT of the abdomen and pelvis     Sung Mcgregor MD       Electronically Signed and Approved By: Sung Mcgregor MD on 3/15/2024 at 9:57             CT Chest With Contrast Diagnostic    Result Date: 3/15/2024    1. Mild cardiomegaly 2. Small dependent pleural effusions 3. No fractures are identified.     Sung Mcgregor MD       Electronically Signed and Approved By: Sung Mcgregor MD on 3/15/2024 at 9:55             CT Cervical Spine Without Contrast    Result Date: 3/15/2024    1. Straightening of the normal cervical lordosis otherwise normal CT of the cervical spine.     Sung Mcgregor MD       Electronically Signed and Approved By: Sung Mcgregor MD on 3/15/2024 at 9:51                 The above data has been reviewed by NAZ Yanez 04/19/2024 14:15 EDT.          Patient Care Team:  Luann Gallego APRN as PCP - General (Nurse Practitioner)           ASSESSMENT & PLAN    Diagnoses and all orders for this visit:    1. Annual physical exam (Primary)  Comments:  preventative counseling  healthy  diet  daily exercise  get adequate sleep  Orders:  -     Comprehensive Metabolic Panel; Future  -     CBC & Differential; Future  -     Lipid Panel; Future  -     TSH+Free T4; Future  -     Hepatitis C antibody; Future  -     Chlamydia trachomatis, Neisseria gonorrhoeae, PCR - Urine, Urine, Clean Catch; Future    2. Screening for chlamydial disease  -     Chlamydia trachomatis, Neisseria gonorrhoeae, PCR - Urine, Urine, Clean Catch; Future    3. Encounter to establish care  Comments:  medical hx and medications reviewed    4. Screening for lipid disorders  -     Lipid Panel; Future    5. Screening for thyroid disorder  -     TSH+Free T4; Future    6. Need for hepatitis C screening test  -     Hepatitis C antibody; Future    7. Need for Tdap vaccination  Comments:  given on office today  Orders:  -     Tdap Vaccine => 8yo IM (BOOSTRIX)    8. Recurrent streptococcal pharyngitis  Comments:  ref to ent for evaluation  Orders:  -     Ambulatory Referral to ENT (Otolaryngology)    9. Mild intermittent asthma without complication  Comments:  stable, refilled albuterol  Overview:  Formatting of this note might be different from the original.  - last sx summer 2018  - sx usually season now  - was persistent in past, hx hospitalizations    Orders:  -     albuterol sulfate  (90 Base) MCG/ACT inhaler; Inhale 2 puffs Every 6 (Six) Hours As Needed for Wheezing or Shortness of Air.  Dispense: 8 g; Refill: 2         Tobacco Use: Low Risk  (4/19/2024)    Patient History     Smoking Tobacco Use: Never     Smokeless Tobacco Use: Never     Passive Exposure: Never       Follow Up     Return in about 6 months (around 10/19/2024) for Next scheduled follow up.      Patient was given instructions and counseling regarding her condition or for health maintenance advice. Please see specific information pulled into the AVS if appropriate.   I have reviewed information obtained and documented by others and I have confirmed the accuracy of  this documented note.    Luann Gallego, APRN

## 2024-05-21 ENCOUNTER — TELEPHONE (OUTPATIENT)
Dept: FAMILY MEDICINE CLINIC | Facility: CLINIC | Age: 24
End: 2024-05-21
Payer: COMMERCIAL

## 2024-05-24 ENCOUNTER — TELEPHONE (OUTPATIENT)
Dept: FAMILY MEDICINE CLINIC | Facility: CLINIC | Age: 24
End: 2024-05-24
Payer: COMMERCIAL

## 2024-07-23 ENCOUNTER — TELEPHONE (OUTPATIENT)
Dept: FAMILY MEDICINE CLINIC | Facility: CLINIC | Age: 24
End: 2024-07-23
Payer: COMMERCIAL

## 2024-07-23 NOTE — TELEPHONE ENCOUNTER
HUB TO RELAY AND SCHEDULE      CALLED PATIENT TO RESCHEDULE APPT FOR 10/22/2024 DUE TO PROVIDER BEING OUT OF OFFICE. NO ANSWER, LEFT VM.

## 2024-07-24 NOTE — TELEPHONE ENCOUNTER
HUB TO RELAY AND SCHEDULE      2ND ATTEMPT: CALLED PATIENT TO RESCHEDULE APPT FOR 10/22/2024 DUE TO PROVIDER BEING OUT OF OFFICE. NO ANSWER, LEFT VM.

## 2024-07-25 NOTE — TELEPHONE ENCOUNTER
HUB TO RELAY AND SCHEDULE    3RD ATTEMPT: CALLED PATIENT TO RESCHEDULE APPT FOR 10/22/2024 DUE TO PROVIDER BEING OUT OF OFFICE. NO ANSWER, LEFT VM.

## 2024-07-26 ENCOUNTER — LAB (OUTPATIENT)
Dept: LAB | Facility: HOSPITAL | Age: 24
End: 2024-07-26
Payer: COMMERCIAL

## 2024-07-26 DIAGNOSIS — Z00.00 ANNUAL PHYSICAL EXAM: ICD-10-CM

## 2024-07-26 DIAGNOSIS — Z13.29 SCREENING FOR THYROID DISORDER: ICD-10-CM

## 2024-07-26 DIAGNOSIS — Z13.220 SCREENING FOR LIPID DISORDERS: ICD-10-CM

## 2024-07-26 DIAGNOSIS — Z11.59 NEED FOR HEPATITIS C SCREENING TEST: ICD-10-CM

## 2024-07-26 LAB
ALBUMIN SERPL-MCNC: 4.4 G/DL (ref 3.5–5.2)
ALBUMIN/GLOB SERPL: 1.3 G/DL
ALP SERPL-CCNC: 65 U/L (ref 39–117)
ALT SERPL W P-5'-P-CCNC: 13 U/L (ref 1–33)
ANION GAP SERPL CALCULATED.3IONS-SCNC: 12.6 MMOL/L (ref 5–15)
AST SERPL-CCNC: 15 U/L (ref 1–32)
BASOPHILS # BLD AUTO: 0.09 10*3/MM3 (ref 0–0.2)
BASOPHILS NFR BLD AUTO: 1.2 % (ref 0–1.5)
BILIRUB SERPL-MCNC: 0.4 MG/DL (ref 0–1.2)
BUN SERPL-MCNC: 9 MG/DL (ref 6–20)
BUN/CREAT SERPL: 12 (ref 7–25)
CALCIUM SPEC-SCNC: 9.2 MG/DL (ref 8.6–10.5)
CHLORIDE SERPL-SCNC: 105 MMOL/L (ref 98–107)
CHOLEST SERPL-MCNC: 147 MG/DL (ref 0–200)
CO2 SERPL-SCNC: 21.4 MMOL/L (ref 22–29)
CREAT SERPL-MCNC: 0.75 MG/DL (ref 0.57–1)
DEPRECATED RDW RBC AUTO: 40.4 FL (ref 37–54)
EGFRCR SERPLBLD CKD-EPI 2021: 115.6 ML/MIN/1.73
EOSINOPHIL # BLD AUTO: 0.21 10*3/MM3 (ref 0–0.4)
EOSINOPHIL NFR BLD AUTO: 2.7 % (ref 0.3–6.2)
ERYTHROCYTE [DISTWIDTH] IN BLOOD BY AUTOMATED COUNT: 12.3 % (ref 12.3–15.4)
GLOBULIN UR ELPH-MCNC: 3.3 GM/DL
GLUCOSE SERPL-MCNC: 89 MG/DL (ref 65–99)
HCT VFR BLD AUTO: 43.8 % (ref 34–46.6)
HCV AB SER QL: NORMAL
HDLC SERPL-MCNC: 35 MG/DL (ref 40–60)
HGB BLD-MCNC: 15.2 G/DL (ref 12–15.9)
IMM GRANULOCYTES # BLD AUTO: 0.03 10*3/MM3 (ref 0–0.05)
IMM GRANULOCYTES NFR BLD AUTO: 0.4 % (ref 0–0.5)
LDLC SERPL CALC-MCNC: 95 MG/DL (ref 0–100)
LDLC/HDLC SERPL: 2.69 {RATIO}
LYMPHOCYTES # BLD AUTO: 1.96 10*3/MM3 (ref 0.7–3.1)
LYMPHOCYTES NFR BLD AUTO: 25.2 % (ref 19.6–45.3)
MCH RBC QN AUTO: 31.1 PG (ref 26.6–33)
MCHC RBC AUTO-ENTMCNC: 34.7 G/DL (ref 31.5–35.7)
MCV RBC AUTO: 89.8 FL (ref 79–97)
MONOCYTES # BLD AUTO: 0.41 10*3/MM3 (ref 0.1–0.9)
MONOCYTES NFR BLD AUTO: 5.3 % (ref 5–12)
NEUTROPHILS NFR BLD AUTO: 5.08 10*3/MM3 (ref 1.7–7)
NEUTROPHILS NFR BLD AUTO: 65.2 % (ref 42.7–76)
NRBC BLD AUTO-RTO: 0 /100 WBC (ref 0–0.2)
PLATELET # BLD AUTO: 315 10*3/MM3 (ref 140–450)
PMV BLD AUTO: 10.8 FL (ref 6–12)
POTASSIUM SERPL-SCNC: 4 MMOL/L (ref 3.5–5.2)
PROT SERPL-MCNC: 7.7 G/DL (ref 6–8.5)
RBC # BLD AUTO: 4.88 10*6/MM3 (ref 3.77–5.28)
SODIUM SERPL-SCNC: 139 MMOL/L (ref 136–145)
T4 FREE SERPL-MCNC: 1.07 NG/DL (ref 0.92–1.68)
TRIGL SERPL-MCNC: 89 MG/DL (ref 0–150)
TSH SERPL DL<=0.05 MIU/L-ACNC: 1.12 UIU/ML (ref 0.27–4.2)
VLDLC SERPL-MCNC: 17 MG/DL (ref 5–40)
WBC NRBC COR # BLD AUTO: 7.78 10*3/MM3 (ref 3.4–10.8)

## 2024-07-26 PROCEDURE — 86803 HEPATITIS C AB TEST: CPT

## 2024-07-26 PROCEDURE — 36415 COLL VENOUS BLD VENIPUNCTURE: CPT

## 2024-07-26 PROCEDURE — 84439 ASSAY OF FREE THYROXINE: CPT

## 2024-07-26 PROCEDURE — 80053 COMPREHEN METABOLIC PANEL: CPT

## 2024-07-26 PROCEDURE — 80061 LIPID PANEL: CPT

## 2024-07-26 PROCEDURE — 84443 ASSAY THYROID STIM HORMONE: CPT

## 2024-07-26 PROCEDURE — 85025 COMPLETE CBC W/AUTO DIFF WBC: CPT

## 2024-08-19 ENCOUNTER — HOSPITAL ENCOUNTER (EMERGENCY)
Facility: HOSPITAL | Age: 24
Discharge: LEFT BEFORE DISCHARGE | End: 2024-08-19
Payer: COMMERCIAL

## 2024-08-19 VITALS
SYSTOLIC BLOOD PRESSURE: 130 MMHG | WEIGHT: 235.89 LBS | OXYGEN SATURATION: 100 % | TEMPERATURE: 98.3 F | HEIGHT: 62 IN | HEART RATE: 68 BPM | RESPIRATION RATE: 18 BRPM | BODY MASS INDEX: 43.41 KG/M2 | DIASTOLIC BLOOD PRESSURE: 61 MMHG

## 2024-08-19 LAB
ALBUMIN SERPL-MCNC: 4.1 G/DL (ref 3.5–5.2)
ALBUMIN/GLOB SERPL: 1.2 G/DL
ALP SERPL-CCNC: 66 U/L (ref 39–117)
ALT SERPL W P-5'-P-CCNC: 18 U/L (ref 1–33)
ANION GAP SERPL CALCULATED.3IONS-SCNC: 10.9 MMOL/L (ref 5–15)
AST SERPL-CCNC: 19 U/L (ref 1–32)
BACTERIA UR QL AUTO: ABNORMAL /HPF
BASOPHILS # BLD AUTO: 0.08 10*3/MM3 (ref 0–0.2)
BASOPHILS NFR BLD AUTO: 0.9 % (ref 0–1.5)
BILIRUB SERPL-MCNC: 0.3 MG/DL (ref 0–1.2)
BILIRUB UR QL STRIP: NEGATIVE
BUN SERPL-MCNC: 9 MG/DL (ref 6–20)
BUN/CREAT SERPL: 12.3 (ref 7–25)
CALCIUM SPEC-SCNC: 9.2 MG/DL (ref 8.6–10.5)
CHLORIDE SERPL-SCNC: 102 MMOL/L (ref 98–107)
CLARITY UR: CLEAR
CO2 SERPL-SCNC: 25.1 MMOL/L (ref 22–29)
COLOR UR: YELLOW
CREAT SERPL-MCNC: 0.73 MG/DL (ref 0.57–1)
DEPRECATED RDW RBC AUTO: 38.4 FL (ref 37–54)
EGFRCR SERPLBLD CKD-EPI 2021: 118.7 ML/MIN/1.73
EOSINOPHIL # BLD AUTO: 0.29 10*3/MM3 (ref 0–0.4)
EOSINOPHIL NFR BLD AUTO: 3.3 % (ref 0.3–6.2)
ERYTHROCYTE [DISTWIDTH] IN BLOOD BY AUTOMATED COUNT: 12.1 % (ref 12.3–15.4)
GLOBULIN UR ELPH-MCNC: 3.5 GM/DL
GLUCOSE SERPL-MCNC: 87 MG/DL (ref 65–99)
GLUCOSE UR STRIP-MCNC: NEGATIVE MG/DL
HCG INTACT+B SERPL-ACNC: <0.5 MIU/ML
HCT VFR BLD AUTO: 41.4 % (ref 34–46.6)
HGB BLD-MCNC: 14.6 G/DL (ref 12–15.9)
HGB UR QL STRIP.AUTO: NEGATIVE
HOLD SPECIMEN: NORMAL
HOLD SPECIMEN: NORMAL
HYALINE CASTS UR QL AUTO: ABNORMAL /LPF
IMM GRANULOCYTES # BLD AUTO: 0.02 10*3/MM3 (ref 0–0.05)
IMM GRANULOCYTES NFR BLD AUTO: 0.2 % (ref 0–0.5)
KETONES UR QL STRIP: NEGATIVE
LEUKOCYTE ESTERASE UR QL STRIP.AUTO: ABNORMAL
LIPASE SERPL-CCNC: 25 U/L (ref 13–60)
LYMPHOCYTES # BLD AUTO: 2.8 10*3/MM3 (ref 0.7–3.1)
LYMPHOCYTES NFR BLD AUTO: 31.9 % (ref 19.6–45.3)
MCH RBC QN AUTO: 30.7 PG (ref 26.6–33)
MCHC RBC AUTO-ENTMCNC: 35.3 G/DL (ref 31.5–35.7)
MCV RBC AUTO: 87 FL (ref 79–97)
MONOCYTES # BLD AUTO: 0.41 10*3/MM3 (ref 0.1–0.9)
MONOCYTES NFR BLD AUTO: 4.7 % (ref 5–12)
NEUTROPHILS NFR BLD AUTO: 5.17 10*3/MM3 (ref 1.7–7)
NEUTROPHILS NFR BLD AUTO: 59 % (ref 42.7–76)
NITRITE UR QL STRIP: NEGATIVE
NRBC BLD AUTO-RTO: 0 /100 WBC (ref 0–0.2)
PH UR STRIP.AUTO: 6.5 [PH] (ref 5–8)
PLATELET # BLD AUTO: 284 10*3/MM3 (ref 140–450)
PMV BLD AUTO: 10.4 FL (ref 6–12)
POTASSIUM SERPL-SCNC: 4.1 MMOL/L (ref 3.5–5.2)
PROT SERPL-MCNC: 7.6 G/DL (ref 6–8.5)
PROT UR QL STRIP: NEGATIVE
RBC # BLD AUTO: 4.76 10*6/MM3 (ref 3.77–5.28)
RBC # UR STRIP: ABNORMAL /HPF
REF LAB TEST METHOD: ABNORMAL
SODIUM SERPL-SCNC: 138 MMOL/L (ref 136–145)
SP GR UR STRIP: 1.01 (ref 1–1.03)
SQUAMOUS #/AREA URNS HPF: ABNORMAL /HPF
UROBILINOGEN UR QL STRIP: ABNORMAL
WBC # UR STRIP: ABNORMAL /HPF
WBC NRBC COR # BLD AUTO: 8.77 10*3/MM3 (ref 3.4–10.8)
WHOLE BLOOD HOLD COAG: NORMAL
WHOLE BLOOD HOLD SPECIMEN: NORMAL

## 2024-08-19 PROCEDURE — 85025 COMPLETE CBC W/AUTO DIFF WBC: CPT

## 2024-08-19 PROCEDURE — 99211 OFF/OP EST MAY X REQ PHY/QHP: CPT

## 2024-08-19 PROCEDURE — 81001 URINALYSIS AUTO W/SCOPE: CPT

## 2024-08-19 PROCEDURE — 80053 COMPREHEN METABOLIC PANEL: CPT

## 2024-08-19 PROCEDURE — 84702 CHORIONIC GONADOTROPIN TEST: CPT

## 2024-08-19 PROCEDURE — 36415 COLL VENOUS BLD VENIPUNCTURE: CPT

## 2024-08-19 PROCEDURE — 83690 ASSAY OF LIPASE: CPT

## 2024-08-19 RX ORDER — SODIUM CHLORIDE 0.9 % (FLUSH) 0.9 %
10 SYRINGE (ML) INJECTION AS NEEDED
Status: DISCONTINUED | OUTPATIENT
Start: 2024-08-19 | End: 2024-08-20 | Stop reason: HOSPADM

## 2024-08-20 NOTE — ED TRIAGE NOTES
N/V, RLQ, R flank pain while at work this afternoon. Reports 102.2 fever yesterday, controlled with tylenol. Denies urinary symptoms, denies vaginal symptoms.

## 2024-08-21 ENCOUNTER — OFFICE VISIT (OUTPATIENT)
Dept: FAMILY MEDICINE CLINIC | Facility: CLINIC | Age: 24
End: 2024-08-21
Payer: COMMERCIAL

## 2024-08-21 ENCOUNTER — TELEPHONE (OUTPATIENT)
Dept: OTOLARYNGOLOGY | Facility: CLINIC | Age: 24
End: 2024-08-21
Payer: COMMERCIAL

## 2024-08-21 VITALS
HEART RATE: 93 BPM | DIASTOLIC BLOOD PRESSURE: 76 MMHG | BODY MASS INDEX: 42.25 KG/M2 | OXYGEN SATURATION: 97 % | HEIGHT: 62 IN | SYSTOLIC BLOOD PRESSURE: 127 MMHG | TEMPERATURE: 97.7 F | WEIGHT: 229.6 LBS

## 2024-08-21 DIAGNOSIS — R11.2 NAUSEA AND VOMITING, UNSPECIFIED VOMITING TYPE: ICD-10-CM

## 2024-08-21 DIAGNOSIS — R10.11 RIGHT UPPER QUADRANT ABDOMINAL PAIN: ICD-10-CM

## 2024-08-21 DIAGNOSIS — N30.00 ACUTE CYSTITIS WITHOUT HEMATURIA: ICD-10-CM

## 2024-08-21 DIAGNOSIS — R10.9 RIGHT FLANK PAIN: Primary | ICD-10-CM

## 2024-08-21 LAB
BILIRUB BLD-MCNC: NEGATIVE MG/DL
CLARITY, POC: CLEAR
COLOR UR: YELLOW
EXPIRATION DATE: ABNORMAL
GLUCOSE UR STRIP-MCNC: NEGATIVE MG/DL
KETONES UR QL: NEGATIVE
LEUKOCYTE EST, POC: ABNORMAL
Lab: ABNORMAL
NITRITE UR-MCNC: NEGATIVE MG/ML
PH UR: 5.5 [PH] (ref 5–8)
PROT UR STRIP-MCNC: NEGATIVE MG/DL
RBC # UR STRIP: NEGATIVE /UL
SP GR UR: 1.03 (ref 1–1.03)
UROBILINOGEN UR QL: NORMAL

## 2024-08-21 PROCEDURE — 1159F MED LIST DOCD IN RCRD: CPT

## 2024-08-21 PROCEDURE — 1160F RVW MEDS BY RX/DR IN RCRD: CPT

## 2024-08-21 PROCEDURE — 99213 OFFICE O/P EST LOW 20 MIN: CPT

## 2024-08-21 PROCEDURE — 87086 URINE CULTURE/COLONY COUNT: CPT

## 2024-08-21 PROCEDURE — 1125F AMNT PAIN NOTED PAIN PRSNT: CPT

## 2024-08-21 RX ORDER — SULFAMETHOXAZOLE AND TRIMETHOPRIM 800; 160 MG/1; MG/1
1 TABLET ORAL 2 TIMES DAILY
Qty: 14 TABLET | Refills: 0 | Status: SHIPPED | OUTPATIENT
Start: 2024-08-21 | End: 2024-08-28

## 2024-08-21 RX ORDER — ONDANSETRON 4 MG/1
4 TABLET, ORALLY DISINTEGRATING ORAL 4 TIMES DAILY PRN
Qty: 12 TABLET | Refills: 0 | Status: SHIPPED | OUTPATIENT
Start: 2024-08-21

## 2024-08-21 NOTE — TELEPHONE ENCOUNTER
Left message for patient to call our office.    HUB to Read:    We need to inform patient that Dr. Beth is no longer with [facility].    We need to offer to reschedule patient with one of our other providers, please schedule for next available New Patient slot.

## 2024-08-21 NOTE — PROGRESS NOTES
Chief Complaint  Fever, Vomiting, and Back Pain (Right Side)    SUBJECTIVE  Kyara Christianson presents to [facility]    History of Present Illness  Patient is 24 year-old female who presents today for right side of back and lower abdominal pain, vomiting x 3 days, and low grade fever the past 2 days but no fever today. She presented to the ER on 8/19/2024 for same complaints.  Patient had to leave without being seen due to not having childcare.  Labs and UA from ER showed unremarkable CBC, urine microscopic showed 1+ bacteria and white blood cells.  HCG was negative.  Abdominal Pain  This is a new problem. The current episode started yesterday. The onset quality is sudden. The problem occurs 2 to 4 times per day. The most recent episode lasted 1 hours. The problem has been unchanged. The pain is located in the RLQ. The pain is at a severity of 5/10. The quality of the pain is burning. The abdominal pain radiates to the RLQ. Associated symptoms include arthralgias, diarrhea, a fever, myalgias, nausea and vomiting. Pertinent negatives include no anorexia, belching, constipation, dysuria, flatus, frequency, hematochezia, hematuria, melena or weight loss. The pain is aggravated by palpation. The pain is relieved by Vomiting.   Fever   Associated symptoms include abdominal pain, diarrhea, nausea and vomiting. Pertinent negatives include no urinary pain.   Vomiting   Associated symptoms include abdominal pain, arthralgias, diarrhea, a fever and myalgias. Pertinent negatives include no weight loss.   Back Pain  Associated symptoms include abdominal pain and a fever. Pertinent negatives include no dysuria or weight loss.     Past Medical History:   Diagnosis Date    Anxiety     Arthritis of back     Asthma     All my childhood.    CTS (carpal tunnel syndrome)     Possibly?    Depression     Fracture of wrist     GERD (gastroesophageal reflux disease)       Family History   Problem Relation  "Age of Onset    Hypertension Mother     Mental illness Mother     Diabetes Mother     Kidney failure Mother     Depression Mother     Drug abuse Mother     Anxiety disorder Mother     Arthritis Mother     Hyperlipidemia Mother     Kidney disease Mother     Migraines Sister       History reviewed. No pertinent surgical history.     Current Outpatient Medications:     albuterol sulfate  (90 Base) MCG/ACT inhaler, Inhale 2 puffs Every 6 (Six) Hours As Needed for Wheezing or Shortness of Air., Disp: 8 g, Rfl: 2    ondansetron ODT (ZOFRAN-ODT) 4 MG disintegrating tablet, Place 1 tablet on the tongue 4 (Four) Times a Day As Needed for Nausea., Disp: 12 tablet, Rfl: 0    diclofenac (VOLTAREN) 50 MG EC tablet, Take 1 tablet by mouth 2 (Two) Times a Day As Needed (pain). (Patient not taking: Reported on 8/21/2024), Disp: 30 tablet, Rfl: 0    lidocaine (LIDODERM) 5 %, Place 1 patch on the skin as directed by provider Daily. Remove & Discard patch within 12 hours or as directed by MD (Patient not taking: Reported on 8/21/2024), Disp: 30 each, Rfl: 0    sulfamethoxazole-trimethoprim (BACTRIM DS,SEPTRA DS) 800-160 MG per tablet, Take 1 tablet by mouth 2 (Two) Times a Day for 7 days., Disp: 14 tablet, Rfl: 0    OBJECTIVE  Vital Signs:   /76 (BP Location: Right arm, Patient Position: Sitting, Cuff Size: Adult)   Pulse 93   Temp 97.7 °F (36.5 °C) (Oral)   Ht 157.5 cm (62\")   Wt 104 kg (229 lb 9.6 oz)   SpO2 97%   BMI 41.99 kg/m²    Estimated body mass index is 41.99 kg/m² as calculated from the following:    Height as of this encounter: 157.5 cm (62\").    Weight as of this encounter: 104 kg (229 lb 9.6 oz).     Wt Readings from Last 3 Encounters:   08/21/24 104 kg (229 lb 9.6 oz)   04/19/24 103 kg (226 lb 12.8 oz)   04/05/24 104 kg (228 lb 12.8 oz)     BP Readings from Last 3 Encounters:   08/21/24 127/76   05/28/24 143/87   04/19/24 127/76       Physical Exam  Vitals reviewed.   Constitutional:       General: " She is not in acute distress.  HENT:      Head: Normocephalic and atraumatic.   Eyes:      Conjunctiva/sclera: Conjunctivae normal.   Cardiovascular:      Rate and Rhythm: Normal rate and regular rhythm.      Heart sounds: Normal heart sounds.   Pulmonary:      Effort: Pulmonary effort is normal.      Breath sounds: Normal breath sounds.   Abdominal:      Palpations: Abdomen is soft.      Tenderness: There is abdominal tenderness in the right upper quadrant. There is right CVA tenderness.   Neurological:      Mental Status: She is alert.          Result Review    Common labs          3/15/2024    09:15 7/26/2024    15:17 8/19/2024    21:30   Common Labs   Glucose 98  89  87    BUN 11  9  9    Creatinine 0.78  0.75  0.73    Sodium 139  139  138    Potassium 3.3  4.0  4.1    Chloride 105  105  102    Calcium 8.7  9.2  9.2    Albumin 4.2  4.4  4.1    Total Bilirubin 0.4  0.4  0.3    Alkaline Phosphatase 58  65  66    AST (SGOT) 17  15  19    ALT (SGPT) 15  13  18    WBC 8.01  7.78  8.77    Hemoglobin 14.3  15.2  14.6    Hematocrit 40.7  43.8  41.4    Platelets 302  315  284    Total Cholesterol  147     Triglycerides  89     HDL Cholesterol  35     LDL Cholesterol   95         No Images in the past 120 days found..      The above data has been reviewed by NAZ Yanez 08/21/2024 14:32 EDT.          Patient Care Team:  Luann Gallego APRN as PCP - General (Nurse Practitioner)           ASSESSMENT & PLAN    Diagnoses and all orders for this visit:    1. Right flank pain (Primary)  -     POCT urinalysis dipstick, automated    2. Right upper quadrant abdominal pain  Comments:  if no relief after UTI treatment will evaluate further, possible CT abdomen.    3. Nausea and vomiting, unspecified vomiting type  Comments:  refilled zofran  Orders:  -     ondansetron ODT (ZOFRAN-ODT) 4 MG disintegrating tablet; Place 1 tablet on the tongue 4 (Four) Times a Day As Needed for Nausea.  Dispense: 12 tablet; Refill: 0    4.  Acute cystitis without hematuria  Comments:  start bactrim, will send urine for culture  Orders:  -     sulfamethoxazole-trimethoprim (BACTRIM DS,SEPTRA DS) 800-160 MG per tablet; Take 1 tablet by mouth 2 (Two) Times a Day for 7 days.  Dispense: 14 tablet; Refill: 0  -     Urine Culture - Urine, Urine, Clean Catch; Future         Tobacco Use: High Risk (8/21/2024)    Patient History     Smoking Tobacco Use: Every Day     Smokeless Tobacco Use: Never     Passive Exposure: Never       Follow Up     Return if symptoms worsen or fail to improve, worsening symptoms go to ER.      Patient was given instructions and counseling regarding her condition or for health maintenance advice. Please see specific information pulled into the AVS if appropriate.   I have reviewed information obtained and documented by others and I have confirmed the accuracy of this documented note.    NAZ Yanez

## 2024-08-23 LAB — BACTERIA SPEC AEROBE CULT: NORMAL

## 2024-11-14 ENCOUNTER — OFFICE VISIT (OUTPATIENT)
Dept: FAMILY MEDICINE CLINIC | Facility: CLINIC | Age: 24
End: 2024-11-14
Payer: COMMERCIAL

## 2024-11-14 VITALS
BODY MASS INDEX: 40.57 KG/M2 | WEIGHT: 229 LBS | HEIGHT: 63 IN | DIASTOLIC BLOOD PRESSURE: 71 MMHG | OXYGEN SATURATION: 99 % | HEART RATE: 100 BPM | SYSTOLIC BLOOD PRESSURE: 139 MMHG

## 2024-11-14 DIAGNOSIS — N62 LARGE BREASTS: ICD-10-CM

## 2024-11-14 DIAGNOSIS — M25.532 PAIN OF BOTH WRIST JOINTS: ICD-10-CM

## 2024-11-14 DIAGNOSIS — M54.6 ACUTE MIDLINE THORACIC BACK PAIN: Primary | ICD-10-CM

## 2024-11-14 DIAGNOSIS — R11.2 NAUSEA AND VOMITING, UNSPECIFIED VOMITING TYPE: ICD-10-CM

## 2024-11-14 DIAGNOSIS — M25.531 PAIN OF BOTH WRIST JOINTS: ICD-10-CM

## 2024-11-14 PROCEDURE — 1125F AMNT PAIN NOTED PAIN PRSNT: CPT

## 2024-11-14 PROCEDURE — 99214 OFFICE O/P EST MOD 30 MIN: CPT

## 2024-11-14 RX ORDER — CYCLOBENZAPRINE HCL 10 MG
10 TABLET ORAL 3 TIMES DAILY PRN
Qty: 21 TABLET | Refills: 1 | Status: SHIPPED | OUTPATIENT
Start: 2024-11-14

## 2024-11-14 RX ORDER — ONDANSETRON 4 MG/1
4 TABLET, ORALLY DISINTEGRATING ORAL 4 TIMES DAILY PRN
Qty: 12 TABLET | Refills: 0 | Status: SHIPPED | OUTPATIENT
Start: 2024-11-14

## 2024-11-14 RX ORDER — METHYLPREDNISOLONE 4 MG/1
TABLET ORAL
Qty: 21 TABLET | Refills: 0 | Status: SHIPPED | OUTPATIENT
Start: 2024-11-14

## 2024-11-14 NOTE — PROGRESS NOTES
Chief Complaint  Back Pain    SUBJECTIVE  Kyara Christianson presents to [facility]    History of Present Illness  Patient is 24-year-old female who presents today for upper and mid back severe back pain x 1 wk (pt has a hx of back pain). Xray thoracic from 2023 was unremarkable.  She has noticed bilateral wrist pain since weather has turned cold.     She is still having some N/V due to gastroenteritis dx by  last week.     She also would like to discuss a breast reduction. She states she often has discomfort of chest from heaviness of breasts.        Past Medical History:   Diagnosis Date    Anxiety     Arthritis of back     Asthma     All my childhood.    CTS (carpal tunnel syndrome)     Possibly?    Depression     Fracture of wrist     GERD (gastroesophageal reflux disease)       Family History   Problem Relation Age of Onset    Hypertension Mother     Mental illness Mother     Diabetes Mother     Kidney failure Mother     Depression Mother     Drug abuse Mother     Anxiety disorder Mother     Arthritis Mother     Hyperlipidemia Mother     Kidney disease Mother     Migraines Sister       No past surgical history on file.     Current Outpatient Medications:     albuterol sulfate  (90 Base) MCG/ACT inhaler, Inhale 2 puffs Every 6 (Six) Hours As Needed for Wheezing or Shortness of Air., Disp: 8 g, Rfl: 2    ondansetron ODT (ZOFRAN-ODT) 4 MG disintegrating tablet, Place 1 tablet on the tongue 4 (Four) Times a Day As Needed for Nausea., Disp: 12 tablet, Rfl: 0    cyclobenzaprine (FLEXERIL) 10 MG tablet, Take 1 tablet by mouth 3 (Three) Times a Day As Needed for Muscle Spasms., Disp: 21 tablet, Rfl: 1    diclofenac (VOLTAREN) 50 MG EC tablet, Take 1 tablet by mouth 2 (Two) Times a Day As Needed (joint pain)., Disp: 90 tablet, Rfl: 1    methylPREDNISolone (MEDROL) 4 MG dose pack, Take as directed on package instructions., Disp: 21 tablet, Rfl: 0    OBJECTIVE  Vital Signs:   BP  "139/71 (BP Location: Left arm, Patient Position: Sitting, Cuff Size: Large Adult)   Pulse 100   Ht 160 cm (63\")   Wt 104 kg (229 lb)   LMP 10/24/2024   SpO2 99%   BMI 40.57 kg/m²    Estimated body mass index is 40.57 kg/m² as calculated from the following:    Height as of this encounter: 160 cm (63\").    Weight as of this encounter: 104 kg (229 lb).     Wt Readings from Last 3 Encounters:   11/14/24 104 kg (229 lb)   11/06/24 104 kg (229 lb)   08/23/24 104 kg (230 lb 4.8 oz)     BP Readings from Last 3 Encounters:   11/14/24 139/71   11/06/24 114/76   08/23/24 124/71       Physical Exam  Vitals reviewed.   Constitutional:       General: She is not in acute distress.     Appearance: She is not ill-appearing.   HENT:      Head: Normocephalic and atraumatic.   Eyes:      Conjunctiva/sclera: Conjunctivae normal.   Cardiovascular:      Rate and Rhythm: Normal rate and regular rhythm.      Heart sounds: Normal heart sounds.   Pulmonary:      Effort: Pulmonary effort is normal.      Breath sounds: Normal breath sounds.   Musculoskeletal:      Right wrist: Tenderness present. No swelling. Normal range of motion.      Left wrist: Tenderness present. No swelling. Normal range of motion.      Cervical back: Normal range of motion.      Thoracic back: Tenderness present. No swelling, deformity or spasms.   Neurological:      Mental Status: She is alert and oriented to person, place, and time.   Psychiatric:         Mood and Affect: Mood normal.         Behavior: Behavior normal.         Thought Content: Thought content normal.         Judgment: Judgment normal.          Result Review        No Images in the past 120 days found..      The above data has been reviewed by NAZ Yanez 11/14/2024 14:57 EST.          Patient Care Team:  Luann Gallego APRN as PCP - General (Nurse Practitioner)            ASSESSMENT & PLAN    Diagnoses and all orders for this visit:    1. Acute midline thoracic back pain " (Primary)  Comments:  start medrol dose pack and prn flexeril, warned of drowsiness, do not drive after taking muscle relaxer  Orders:  -     methylPREDNISolone (MEDROL) 4 MG dose pack; Take as directed on package instructions.  Dispense: 21 tablet; Refill: 0  -     cyclobenzaprine (FLEXERIL) 10 MG tablet; Take 1 tablet by mouth 3 (Three) Times a Day As Needed for Muscle Spasms.  Dispense: 21 tablet; Refill: 1    2. Pain of both wrist joints  Comments:  can start voltaren after finishing medrol dose pack  Orders:  -     diclofenac (VOLTAREN) 50 MG EC tablet; Take 1 tablet by mouth 2 (Two) Times a Day As Needed (joint pain).  Dispense: 90 tablet; Refill: 1    3. Nausea and vomiting, unspecified vomiting type  Comments:  refilled zofran  Orders:  -     ondansetron ODT (ZOFRAN-ODT) 4 MG disintegrating tablet; Place 1 tablet on the tongue 4 (Four) Times a Day As Needed for Nausea.  Dispense: 12 tablet; Refill: 0    4. Large breasts  -     Ambulatory Referral to Breast Surgery         Tobacco Use: High Risk (11/14/2024)    Patient History     Smoking Tobacco Use: Every Day     Smokeless Tobacco Use: Never     Passive Exposure: Never       Follow Up     No follow-ups on file.      Patient was given instructions and counseling regarding her condition or for health maintenance advice. Please see specific information pulled into the AVS if appropriate.   I have reviewed information obtained and documented by others and I have confirmed the accuracy of this documented note.    NAZ Yanez         18-May-2017

## 2024-11-26 ENCOUNTER — OFFICE VISIT (OUTPATIENT)
Dept: FAMILY MEDICINE CLINIC | Facility: CLINIC | Age: 24
End: 2024-11-26
Payer: COMMERCIAL

## 2024-11-26 VITALS
OXYGEN SATURATION: 100 % | SYSTOLIC BLOOD PRESSURE: 119 MMHG | WEIGHT: 225.4 LBS | HEIGHT: 63 IN | BODY MASS INDEX: 39.94 KG/M2 | HEART RATE: 100 BPM | DIASTOLIC BLOOD PRESSURE: 65 MMHG

## 2024-11-26 DIAGNOSIS — R19.7 DIARRHEA OF PRESUMED INFECTIOUS ORIGIN: Primary | ICD-10-CM

## 2024-11-26 DIAGNOSIS — R10.11 RIGHT UPPER QUADRANT ABDOMINAL PAIN: ICD-10-CM

## 2024-11-26 PROCEDURE — 1125F AMNT PAIN NOTED PAIN PRSNT: CPT | Performed by: FAMILY MEDICINE

## 2024-11-26 PROCEDURE — 99214 OFFICE O/P EST MOD 30 MIN: CPT | Performed by: FAMILY MEDICINE

## 2024-11-26 RX ORDER — PROMETHAZINE HYDROCHLORIDE 25 MG/1
12.5-25 TABLET ORAL EVERY 6 HOURS PRN
Qty: 30 TABLET | Refills: 0 | Status: SHIPPED | OUTPATIENT
Start: 2024-11-26

## 2024-11-26 NOTE — PROGRESS NOTES
"Chief Complaint  Nausea and Diarrhea    Subjective     Problem List  Visit Diagnosis   Encounters  Notes  Medications  Labs  Result Review Imaging  Media    Kyara Christianson presents to [facility]  History of Present Illness    History of Present Illness  The patient is a 24-year-old female who presents with 2 weeks of nausea, vomiting, diarrhea, and right abdominal pain.    Her symptoms began approximately 2 weeks ago. She has a history of gallbladder issues dating back a few years, with a scan revealing small gallstones at that time. Her current symptoms include severe nausea, persistent watery diarrhea, and right-sided pain. She also experienced constipation and two blockages, which she managed with magnesium citrate.     She has not vomited recently but continues to feel nauseous. She reports no recent changes in diet or exposure to individuals with similar symptoms. She has been taking Zofran for nausea but reports no relief. She has not used antibiotics or steroids recently. She is not experiencing any chest pain or shortness of breath.       Objective   Vital Signs:   /65 (BP Location: Left arm, Patient Position: Sitting, Cuff Size: Large Adult)   Pulse 100   Ht 160 cm (63\")   Wt 102 kg (225 lb 6.4 oz)   SpO2 100%   BMI 39.93 kg/m²     Physical Exam  Vitals reviewed.   Constitutional:       General: She is not in acute distress.     Appearance: Normal appearance.   HENT:      Head: Normocephalic and atraumatic.      Mouth/Throat:      Mouth: Mucous membranes are moist.   Eyes:      Conjunctiva/sclera: Conjunctivae normal.   Cardiovascular:      Rate and Rhythm: Normal rate and regular rhythm.      Heart sounds: Normal heart sounds.   Pulmonary:      Effort: Pulmonary effort is normal.      Breath sounds: Normal breath sounds.   Abdominal:      General: Abdomen is flat.      Palpations: Abdomen is soft.      Tenderness: There is abdominal tenderness in the " right upper quadrant, right lower quadrant and epigastric area. There is right CVA tenderness. There is no guarding or rebound. Negative signs include Rovsing's sign.          Comments: Highlighted area of worst pain there is some tenderness noted to the epigastric region right upper quadrant right lower quadrant and right flank.   Musculoskeletal:         General: No swelling.      Cervical back: Normal range of motion and neck supple.   Skin:     General: Skin is warm.   Neurological:      General: No focal deficit present.      Mental Status: She is alert.   Psychiatric:         Mood and Affect: Mood normal.         Behavior: Behavior normal.          Physical Exam      Result Review :Labs  Result Review  Imaging  Med Tab  Media  Procedures       Common labs          3/15/2024    09:15 7/26/2024    15:17 8/19/2024    21:30   Common Labs   Glucose 98  89  87    BUN 11  9  9    Creatinine 0.78  0.75  0.73    Sodium 139  139  138    Potassium 3.3  4.0  4.1    Chloride 105  105  102    Calcium 8.7  9.2  9.2    Albumin 4.2  4.4  4.1    Total Bilirubin 0.4  0.4  0.3    Alkaline Phosphatase 58  65  66    AST (SGOT) 17  15  19    ALT (SGPT) 15  13  18    WBC 8.01  7.78  8.77    Hemoglobin 14.3  15.2  14.6    Hematocrit 40.7  43.8  41.4    Platelets 302  315  284    Total Cholesterol  147     Triglycerides  89     HDL Cholesterol  35     LDL Cholesterol   95       XR Abdomen 2+ VW with Chest 1 VW    Result Date: 11/19/2024  Impression: 1. Rectosigmoid colon fecal impaction. There is increased stool burden in the hepatic flexure and transverse colon. 2. Nonspecific air-fluid levels. There are no dilated loops of bowel to indicate an obstructive process. 3. No active cardiopulmonary disease. Electronically Signed: Rock Guaman MD  11/19/2024 8:57 AM EST  Workstation ID: [id number]   Results for orders placed or performed during the hospital encounter of 11/23/24   POC Rapid Strep A    Collection Time: 11/23/24  2:46 PM     Specimen: Swab   Result Value Ref Range    Rapid Strep A Screen Negative     Internal Control Passed     Lot Number [id number]     Expiration Date 07/22/2025    POC Influenza A/B    Collection Time: 11/23/24  2:48 PM    Specimen: Swab   Result Value Ref Range    Rapid Influenza A Ag Negative Negative    Rapid Influenza B Ag Negative Negative    Internal Control Passed Passed    Lot Number [id number]     Expiration Date 12/14/2025    POCT SARS-CoV-2 Antigen DELORIS   ([facility])    Collection Time: 11/23/24  2:49 PM    Specimen: Swab   Result Value Ref Range    SARS Antigen Not Detected Not Detected, Presumptive Negative    Internal Control Passed Passed    Lot Number [id number]     Expiration Date 05/06/2025        Results        Data reviewed : Radiologic studies KUB this month showed recal impaction- patient notes this was treated separately.       Procedures        Assessment and Plan CC Problem List  Visit Diagnosis   ROS  Review (Popup)  Health Maintenance  Quality  BestPractice  Medications  SmartSets  SnapShot Encounters  Media   Diagnoses and all orders for this visit:    1. Diarrhea of presumed infectious origin (Primary)  -     US Gallbladder; Future  -     Stool Culture (Reference Lab) - Stool, Per Rectum; Future  -     promethazine (PHENERGAN) 25 MG tablet; Take 0.5-1 tablets by mouth Every 6 (Six) Hours As Needed for Nausea or Vomiting.  Dispense: 30 tablet; Refill: 0  -     Gastrointestinal Panel, PCR - Stool, Per Rectum; Future  -     CBC w AUTO Differential; Future  -     Comprehensive metabolic panel; Future    2. Right upper quadrant abdominal pain  -     US Gallbladder; Future  -     CBC w AUTO Differential; Future  -     Comprehensive metabolic panel; Future        Assessment & Plan    She reports severe nausea that has not been alleviated by Zofran. Phenergan has been prescribed as an alternative, with instructions to start with a half tablet to assess tolerance due to its sedative effects.  She is advised not to operate heavy machinery while taking Phenergan.    She has been experiencing watery diarrhea with a yellow tint for over two weeks. A stool study has been ordered to test for viral, bacterial, or parasitic causes. She is advised to collect the stool sample at the lab and return it promptly for testing. She is also advised against taking Imodium to avoid retaining any potential infectious agents.    She reports pain on the right side of her abdomen. An ultrasound of the gallbladder has been ordered to assess for gallbladder-related issues, given her history of gallstones.    Given her prolonged diarrhea, there is a concern for potential electrolyte imbalance. Lab work has been ordered to recheck her electrolyte levels.    Patient had a referral to plastics placed by her PCP, she states she has not heard from them yet we have listed their phone number below for her to follow-up on that referral.    Patient Instructions   [facility] [phone]            Follow Up Instructions Charge Capture  Follow-up Communications   No follow-ups on file.  Patient was given instructions and counseling regarding her condition or for health maintenance advice. Please see specific information pulled into the AVS if appropriate.       Transcribed from ambient dictation for Denilson Narvaez DO by Denilson Narvaez DO.  11/26/24   14:59 EST    Patient or patient representative verbalized consent for the use of Ambient Listening during the visit with  Denilson Narvaez DO for chart documentation. 11/26/2024  15:44 EST

## 2024-11-30 ENCOUNTER — APPOINTMENT (OUTPATIENT)
Dept: CT IMAGING | Facility: HOSPITAL | Age: 24
End: 2024-11-30
Payer: COMMERCIAL

## 2024-11-30 ENCOUNTER — HOSPITAL ENCOUNTER (EMERGENCY)
Facility: HOSPITAL | Age: 24
Discharge: HOME | End: 2024-11-30
Attending: EMERGENCY MEDICINE
Payer: COMMERCIAL

## 2024-11-30 VITALS
HEART RATE: 115 BPM | RESPIRATION RATE: 16 BRPM | SYSTOLIC BLOOD PRESSURE: 143 MMHG | WEIGHT: 224.87 LBS | HEIGHT: 63 IN | OXYGEN SATURATION: 97 % | BODY MASS INDEX: 39.84 KG/M2 | TEMPERATURE: 98.2 F | DIASTOLIC BLOOD PRESSURE: 91 MMHG

## 2024-11-30 DIAGNOSIS — K52.9 GASTROENTERITIS: ICD-10-CM

## 2024-11-30 DIAGNOSIS — R10.9 ABDOMINAL PAIN, UNSPECIFIED ABDOMINAL LOCATION: Primary | ICD-10-CM

## 2024-11-30 LAB
ALBUMIN SERPL-MCNC: 4.1 G/DL (ref 3.5–5.2)
ALBUMIN/GLOB SERPL: 1.2 G/DL
ALP SERPL-CCNC: 61 U/L (ref 39–117)
ALT SERPL W P-5'-P-CCNC: 15 U/L (ref 1–33)
ANION GAP SERPL CALCULATED.3IONS-SCNC: 14.9 MMOL/L (ref 5–15)
AST SERPL-CCNC: 13 U/L (ref 1–32)
BASOPHILS # BLD AUTO: 0.09 10*3/MM3 (ref 0–0.2)
BASOPHILS NFR BLD AUTO: 0.6 % (ref 0–1.5)
BILIRUB SERPL-MCNC: 0.4 MG/DL (ref 0–1.2)
BILIRUB UR QL STRIP: NEGATIVE
BUN SERPL-MCNC: 8 MG/DL (ref 6–20)
BUN/CREAT SERPL: 11.9 (ref 7–25)
CALCIUM SPEC-SCNC: 8.8 MG/DL (ref 8.6–10.5)
CHLORIDE SERPL-SCNC: 104 MMOL/L (ref 98–107)
CLARITY UR: CLEAR
CO2 SERPL-SCNC: 19.1 MMOL/L (ref 22–29)
COLOR UR: YELLOW
CREAT SERPL-MCNC: 0.67 MG/DL (ref 0.57–1)
DEPRECATED RDW RBC AUTO: 38.4 FL (ref 37–54)
EGFRCR SERPLBLD CKD-EPI 2021: 126.1 ML/MIN/1.73
EOSINOPHIL # BLD AUTO: 0.34 10*3/MM3 (ref 0–0.4)
EOSINOPHIL NFR BLD AUTO: 2.4 % (ref 0.3–6.2)
ERYTHROCYTE [DISTWIDTH] IN BLOOD BY AUTOMATED COUNT: 12 % (ref 12.3–15.4)
GLOBULIN UR ELPH-MCNC: 3.3 GM/DL
GLUCOSE SERPL-MCNC: 97 MG/DL (ref 65–99)
GLUCOSE UR STRIP-MCNC: NEGATIVE MG/DL
HCG INTACT+B SERPL-ACNC: <0.5 MIU/ML
HCT VFR BLD AUTO: 41.9 % (ref 34–46.6)
HEMOCCULT STL QL IA: NEGATIVE
HGB BLD-MCNC: 14.7 G/DL (ref 12–15.9)
HGB UR QL STRIP.AUTO: NEGATIVE
HOLD SPECIMEN: NORMAL
HOLD SPECIMEN: NORMAL
IMM GRANULOCYTES # BLD AUTO: 0.06 10*3/MM3 (ref 0–0.05)
IMM GRANULOCYTES NFR BLD AUTO: 0.4 % (ref 0–0.5)
KETONES UR QL STRIP: NEGATIVE
LEUKOCYTE ESTERASE UR QL STRIP.AUTO: NEGATIVE
LIPASE SERPL-CCNC: 32 U/L (ref 13–60)
LYMPHOCYTES # BLD AUTO: 1.96 10*3/MM3 (ref 0.7–3.1)
LYMPHOCYTES NFR BLD AUTO: 14.1 % (ref 19.6–45.3)
MCH RBC QN AUTO: 30.7 PG (ref 26.6–33)
MCHC RBC AUTO-ENTMCNC: 35.1 G/DL (ref 31.5–35.7)
MCV RBC AUTO: 87.5 FL (ref 79–97)
MONOCYTES # BLD AUTO: 0.73 10*3/MM3 (ref 0.1–0.9)
MONOCYTES NFR BLD AUTO: 5.2 % (ref 5–12)
NEUTROPHILS NFR BLD AUTO: 10.73 10*3/MM3 (ref 1.7–7)
NEUTROPHILS NFR BLD AUTO: 77.3 % (ref 42.7–76)
NITRITE UR QL STRIP: NEGATIVE
NRBC BLD AUTO-RTO: 0 /100 WBC (ref 0–0.2)
PH UR STRIP.AUTO: 5.5 [PH] (ref 5–8)
PLATELET # BLD AUTO: 297 10*3/MM3 (ref 140–450)
PMV BLD AUTO: 10.1 FL (ref 6–12)
POTASSIUM SERPL-SCNC: 3.9 MMOL/L (ref 3.5–5.2)
PROT SERPL-MCNC: 7.4 G/DL (ref 6–8.5)
PROT UR QL STRIP: NEGATIVE
RBC # BLD AUTO: 4.79 10*6/MM3 (ref 3.77–5.28)
SODIUM SERPL-SCNC: 138 MMOL/L (ref 136–145)
SP GR UR STRIP: >1.03 (ref 1–1.03)
UROBILINOGEN UR QL STRIP: ABNORMAL
WBC NRBC COR # BLD AUTO: 13.91 10*3/MM3 (ref 3.4–10.8)
WHOLE BLOOD HOLD COAG: NORMAL
WHOLE BLOOD HOLD SPECIMEN: NORMAL

## 2024-11-30 PROCEDURE — 96374 THER/PROPH/DIAG INJ IV PUSH: CPT

## 2024-11-30 PROCEDURE — 84702 CHORIONIC GONADOTROPIN TEST: CPT | Performed by: EMERGENCY MEDICINE

## 2024-11-30 PROCEDURE — 96375 TX/PRO/DX INJ NEW DRUG ADDON: CPT

## 2024-11-30 PROCEDURE — 99285 EMERGENCY DEPT VISIT HI MDM: CPT

## 2024-11-30 PROCEDURE — 25810000003 SODIUM CHLORIDE 0.9 % SOLUTION: Performed by: EMERGENCY MEDICINE

## 2024-11-30 PROCEDURE — 25510000001 IOPAMIDOL PER 1 ML: Performed by: EMERGENCY MEDICINE

## 2024-11-30 PROCEDURE — 96361 HYDRATE IV INFUSION ADD-ON: CPT

## 2024-11-30 PROCEDURE — 82274 ASSAY TEST FOR BLOOD FECAL: CPT | Performed by: EMERGENCY MEDICINE

## 2024-11-30 PROCEDURE — 83690 ASSAY OF LIPASE: CPT | Performed by: EMERGENCY MEDICINE

## 2024-11-30 PROCEDURE — 80053 COMPREHEN METABOLIC PANEL: CPT | Performed by: EMERGENCY MEDICINE

## 2024-11-30 PROCEDURE — 81003 URINALYSIS AUTO W/O SCOPE: CPT | Performed by: EMERGENCY MEDICINE

## 2024-11-30 PROCEDURE — 25010000002 ONDANSETRON PER 1 MG: Performed by: EMERGENCY MEDICINE

## 2024-11-30 PROCEDURE — 74177 CT ABD & PELVIS W/CONTRAST: CPT

## 2024-11-30 PROCEDURE — 85025 COMPLETE CBC W/AUTO DIFF WBC: CPT | Performed by: EMERGENCY MEDICINE

## 2024-11-30 PROCEDURE — 36415 COLL VENOUS BLD VENIPUNCTURE: CPT | Performed by: EMERGENCY MEDICINE

## 2024-11-30 RX ORDER — PANTOPRAZOLE SODIUM 40 MG/10ML
40 INJECTION, POWDER, LYOPHILIZED, FOR SOLUTION INTRAVENOUS ONCE
Status: COMPLETED | OUTPATIENT
Start: 2024-11-30 | End: 2024-11-30

## 2024-11-30 RX ORDER — SODIUM CHLORIDE 0.9 % (FLUSH) 0.9 %
10 SYRINGE (ML) INJECTION AS NEEDED
Status: DISCONTINUED | OUTPATIENT
Start: 2024-11-30 | End: 2024-11-30 | Stop reason: HOSPADM

## 2024-11-30 RX ORDER — ONDANSETRON 2 MG/ML
4 INJECTION INTRAMUSCULAR; INTRAVENOUS ONCE
Status: COMPLETED | OUTPATIENT
Start: 2024-11-30 | End: 2024-11-30

## 2024-11-30 RX ORDER — ACETAMINOPHEN 325 MG/1
650 TABLET ORAL ONCE
Status: COMPLETED | OUTPATIENT
Start: 2024-11-30 | End: 2024-11-30

## 2024-11-30 RX ORDER — ONDANSETRON 4 MG/1
4 TABLET, ORALLY DISINTEGRATING ORAL 4 TIMES DAILY PRN
Qty: 20 TABLET | Refills: 0 | Status: SHIPPED | OUTPATIENT
Start: 2024-11-30

## 2024-11-30 RX ORDER — IOPAMIDOL 755 MG/ML
100 INJECTION, SOLUTION INTRAVASCULAR
Status: COMPLETED | OUTPATIENT
Start: 2024-11-30 | End: 2024-11-30

## 2024-11-30 RX ADMIN — SODIUM CHLORIDE 1000 ML: 9 INJECTION, SOLUTION INTRAVENOUS at 09:32

## 2024-11-30 RX ADMIN — IOPAMIDOL 100 ML: 755 INJECTION, SOLUTION INTRAVENOUS at 10:14

## 2024-11-30 RX ADMIN — ONDANSETRON 4 MG: 2 INJECTION INTRAMUSCULAR; INTRAVENOUS at 09:32

## 2024-11-30 RX ADMIN — PANTOPRAZOLE SODIUM 40 MG: 40 INJECTION, POWDER, FOR SOLUTION INTRAVENOUS at 09:32

## 2024-11-30 RX ADMIN — ACETAMINOPHEN 650 MG: 325 TABLET ORAL at 11:41

## 2024-11-30 NOTE — Clinical Note
[facility] EMERGENCY ROOM  [address] [place] [address]  Phone: [phone]  Fax: [phone]    Kyara Christianson was seen and treated in our emergency department on 11/30/2024.  She may return to work on 12/01/2024.         Thank you for choosing [facility].    Yared Ya MD

## 2024-11-30 NOTE — ED PROVIDER NOTES
Time: 8:58 AM EST  Date of encounter:  11/30/2024  Independent Historian/Clinical History and Information was obtained by:   Patient    History is limited by: N/A    Chief Complaint: Abdominal pain, nausea, vomiting, diarrhea      History of Present Illness:  Patient is a 24 y.o. year old female who presents to the emergency department for evaluation of abdominal pain, nausea, vomiting, diarrhea.  Patient states that she has not felt well for at least a month now.  Has had some constipation, nausea, vomiting, diarrhea.  States that she has had gallbladder issues in the past but never had to have it taken out.  She feels like her gallbladder is not working the way it supposed to.  She was seen in her primary care doctor who has a scan ordered but states that the pain got worse and she came to the hospital.  Reports that her pain is mainly in her upper abdomen as well as in her mid epigastric.  She also reports that she has burping rotten eggs as well as has a foul taste in her mouth.  She also reports that she has been having some intermittent constipation as well as diarrhea as well as some vomiting.  Denies fever.  No other complaints this time.      Patient Care Team  Primary Care Provider: Luann Gallego APRN    Past Medical History:     No Known Allergies  Past Medical History:   Diagnosis Date    Anxiety     Arthritis of back     Asthma     All my childhood.    CTS (carpal tunnel syndrome)     Possibly?    Depression     Fracture of wrist     GERD (gastroesophageal reflux disease)      History reviewed. No pertinent surgical history.  Family History   Problem Relation Age of Onset    Hypertension Mother     Mental illness Mother     Diabetes Mother     Kidney failure Mother     Depression Mother     Drug abuse Mother     Anxiety disorder Mother     Arthritis Mother     Hyperlipidemia Mother     Kidney disease Mother     Migraines Sister        Home Medications:  Prior to Admission medications    Medication Sig  "Start Date End Date Taking? Authorizing Provider   albuterol sulfate  (90 Base) MCG/ACT inhaler Inhale 2 puffs Every 6 (Six) Hours As Needed for Wheezing or Shortness of Air. 4/19/24   Luann Gallego APRN   bisacodyl (DULCOLAX) 10 MG suppository Insert 1 suppository into the rectum Daily.  Patient not taking: Reported on 11/26/2024 11/19/24   Shannen Mckeon APRN   brompheniramine-pseudoephedrine-DM 30-2-10 MG/5ML syrup Take 10 mL by mouth Every 4 (Four) Hours As Needed for Congestion, Cough or Allergies for up to 10 days. 11/23/24 12/3/24  Margoth Quintero APRN   cyclobenzaprine (FLEXERIL) 10 MG tablet Take 1 tablet by mouth 3 (Three) Times a Day As Needed for Muscle Spasms. 11/14/24   Luann Gallego APRN   diclofenac (VOLTAREN) 50 MG EC tablet Take 1 tablet by mouth 2 (Two) Times a Day As Needed (joint pain). 11/14/24   Luann Gallego APRN   ondansetron ODT (ZOFRAN-ODT) 4 MG disintegrating tablet Place 1 tablet on the tongue 4 (Four) Times a Day As Needed for Nausea. 11/14/24   Luann Gallego APRN   promethazine (PHENERGAN) 25 MG tablet Take 0.5-1 tablets by mouth Every 6 (Six) Hours As Needed for Nausea or Vomiting. 11/26/24   Denilson Narvaez DO        Social History:   Social History     Tobacco Use    Smoking status: Every Day     Current packs/day: 0.50     Average packs/day: 0.5 packs/day for 0.5 years (0.3 ttl pk-yrs)     Types: Cigarettes, Cigars     Passive exposure: Never    Smokeless tobacco: Never   Vaping Use    Vaping status: Every Day    Substances: Nicotine    Devices: Disposable, Pre-filled pod   Substance Use Topics    Alcohol use: Not Currently    Drug use: Never         Review of Systems:  Review of Systems     Physical Exam:  /91 (BP Location: Left arm, Patient Position: Sitting)   Pulse 115   Temp 98.2 °F (36.8 °C) (Oral)   Resp 16   Ht 160 cm (63\")   Wt 102 kg (224 lb 13.9 oz)   LMP 11/18/2024 (Approximate)   SpO2 97%   BMI 39.83 kg/m²     Physical " Exam  Vitals and nursing note reviewed.   Constitutional:       Appearance: Normal appearance. She is obese.   HENT:      Head: Normocephalic and atraumatic.   Eyes:      General: No scleral icterus.  Cardiovascular:      Rate and Rhythm: Regular rhythm. Tachycardia present.      Heart sounds: Normal heart sounds.   Pulmonary:      Effort: Pulmonary effort is normal.      Breath sounds: Normal breath sounds.   Abdominal:      Palpations: Abdomen is soft.      Tenderness: There is abdominal tenderness.      Comments: Mild tenderness to the upper abdomen and right upper quadrant.   Musculoskeletal:         General: Normal range of motion.      Cervical back: Normal range of motion.   Skin:     Findings: No rash.   Neurological:      General: No focal deficit present.      Mental Status: She is alert.                  Procedures:  Procedures      Medical Decision Making:      Comorbidities that affect care:    Obesity    External Notes reviewed:    Reviewed note from 11/26/2024      The following orders were placed and all results were independently analyzed by me:  Orders Placed This Encounter   Procedures    CT Abdomen Pelvis With Contrast    [place] Draw    Comprehensive Metabolic Panel    Lipase    Urinalysis With Microscopic If Indicated (No Culture) - Urine, Clean Catch    hCG, Quantitative, Pregnancy    Occult Blood, Fecal By Immunoassay - Stool, Per Rectum    CBC Auto Differential    NPO Diet NPO Type: Strict NPO    Undress & Gown    Insert Peripheral IV    CBC & Differential    Green Top (Gel)    Lavender Top    Gold Top - SST    Light Blue Top       Medications Given in the Emergency Department:  Medications   sodium chloride 0.9 % flush 10 mL (has no administration in time range)   acetaminophen (TYLENOL) tablet 650 mg (has no administration in time range)   sodium chloride 0.9 % bolus 1,000 mL (0 mL Intravenous Stopped 11/30/24 1100)   ondansetron (ZOFRAN) injection 4 mg (4 mg Intravenous Given 11/30/24  0932)   pantoprazole (PROTONIX) injection 40 mg (40 mg Intravenous Given 11/30/24 0932)   iopamidol (ISOVUE-370) 76 % injection 100 mL (100 mL Intravenous Given 11/30/24 1014)        ED Course:         Labs:    Lab Results (last 24 hours)       Procedure Component Value Units Date/Time    CBC & Differential [[id number]]  (Abnormal) Collected: 11/30/24 0924    Specimen: Blood from Arm, Left Updated: 11/30/24 0931    Narrative:      The following orders were created for panel order CBC & Differential.  Procedure                               Abnormality         Status                     ---------                               -----------         ------                     CBC Auto Differential[[id number]]        Abnormal            Final result                 Please view results for these tests on the individual orders.    Comprehensive Metabolic Panel [[id number]]  (Abnormal) Collected: 11/30/24 0924    Specimen: Blood from Arm, Left Updated: 11/30/24 0947     Glucose 97 mg/dL      BUN 8 mg/dL      Creatinine 0.67 mg/dL      Sodium 138 mmol/L      Potassium 3.9 mmol/L      Chloride 104 mmol/L      CO2 19.1 mmol/L      Calcium 8.8 mg/dL      Total Protein 7.4 g/dL      Albumin 4.1 g/dL      ALT (SGPT) 15 U/L      AST (SGOT) 13 U/L      Alkaline Phosphatase 61 U/L      Total Bilirubin 0.4 mg/dL      Globulin 3.3 gm/dL      A/G Ratio 1.2 g/dL      BUN/Creatinine Ratio 11.9     Anion Gap 14.9 mmol/L      eGFR 126.1 mL/min/1.73     Narrative:      GFR Normal >60  Chronic Kidney Disease <60  Kidney Failure <15      Lipase [[id number]]  (Normal) Collected: 11/30/24 0924    Specimen: Blood from Arm, Left Updated: 11/30/24 0947     Lipase 32 U/L     hCG, Quantitative, Pregnancy [[id number]] Collected: 11/30/24 0924    Specimen: Blood from Arm, Left Updated: 11/30/24 0950     HCG Quantitative <0.50 mIU/mL     Narrative:      HCG Ranges by Gestational Age    Females - non-pregnant premenopausal   </= 1mIU/mL HCG  Females -  postmenopausal               </= 7mIU/mL HCG    3 Weeks       5.4   -      72 mIU/mL  4 Weeks      10.2   -     708 mIU/mL  5 Weeks       217   -   8,245 mIU/mL  6 Weeks       152   -  32,177 mIU/mL  7 Weeks     4,059   - 153,767 mIU/mL  8 Weeks    31,366   - 149,094 mIU/mL  9 Weeks    59,109   - 135,901 mIU/mL  10 Weeks   44,186   - 170,409 mIU/mL  12 Weeks   27,107   - 201,615 mIU/mL  14 Weeks   24,302   -  93,646 mIU/mL  15 Weeks   12,540   -  69,747 mIU/mL  16 Weeks    8,904   -  55,332 mIU/mL  17 Weeks    8,240   -  51,793 mIU/mL  18 Weeks    9,649   -  55,271 mIU/mL      CBC Auto Differential [[id number]]  (Abnormal) Collected: 11/30/24 0924    Specimen: Blood from Arm, Left Updated: 11/30/24 0931     WBC 13.91 10*3/mm3      RBC 4.79 10*6/mm3      Hemoglobin 14.7 g/dL      Hematocrit 41.9 %      MCV 87.5 fL      MCH 30.7 pg      MCHC 35.1 g/dL      RDW 12.0 %      RDW-SD 38.4 fl      MPV 10.1 fL      Platelets 297 10*3/mm3      Neutrophil % 77.3 %      Lymphocyte % 14.1 %      Monocyte % 5.2 %      Eosinophil % 2.4 %      Basophil % 0.6 %      Immature Grans % 0.4 %      Neutrophils, Absolute 10.73 10*3/mm3      Lymphocytes, Absolute 1.96 10*3/mm3      Monocytes, Absolute 0.73 10*3/mm3      Eosinophils, Absolute 0.34 10*3/mm3      Basophils, Absolute 0.09 10*3/mm3      Immature Grans, Absolute 0.06 10*3/mm3      nRBC 0.0 /100 WBC     Occult Blood, Fecal By Immunoassay - Stool, Per Rectum [[id number]]  (Normal) Collected: 11/30/24 0939    Specimen: Stool from Per Rectum Updated: 11/30/24 1028     Occult Blood, Fecal by Immunoassay Negative    Urinalysis With Microscopic If Indicated (No Culture) - Urine, Clean Catch [[id number]]  (Abnormal) Collected: 11/30/24 1029    Specimen: Urine, Clean Catch Updated: 11/30/24 1040     Color, UA Yellow     Appearance, UA Clear     pH, UA 5.5     Specific Gravity, UA >1.030     Glucose, UA Negative     Ketones, UA Negative     Bilirubin, UA Negative     Blood, UA Negative      Protein, UA Negative     Leuk Esterase, UA Negative     Nitrite, UA Negative     Urobilinogen, UA 0.2 E.U./dL    Narrative:      Urine microscopic not indicated.             Imaging:    CT Abdomen Pelvis With Contrast    Result Date: 11/30/2024  CT ABDOMEN PELVIS W CONTRAST Date of Exam: 11/30/2024 10:21 AM EST Indication: abdominal pain, RUQ pain. Comparison: 3/15/2024 CT Technique: Axial CT images were obtained of the abdomen and pelvis after the uneventful intravenous administration of iodinated contrast. Reconstructed coronal and sagittal images were also obtained. Automated exposure control and iterative construction methods were used. Findings: The included lung bases show no acute abnormality. There is no suspicious hepatic lesion. Gallbladder is unremarkable. The spleen, pancreas, and bilateral adrenal glands are unremarkable. There is no hydronephrosis or nephrolithiasis. No suspicious renal lesion. There is no evidence of bowel obstruction. Normal appendix. Gas fluid levels throughout the colon. No suspicious lymphadenopathy. No abdominal aortic aneurysm. Urinary bladder is unremarkable. Normal appearance of the uterus. Bilateral ovarian cysts are present, which are normal and require no follow-up. Abdominal and pelvic wall soft tissues show no acute abnormality. There is no fracture or suspicious osseous lesion.      Impression: 1.Gas fluid levels throughout the colon suggesting diarrheal illness. No evidence of bowel obstruction. 2.Normal appendix. Electronically Signed: Lito Cuellar MD  11/30/2024 10:37 AM EST  Workstation ID: [id number]       Differential Diagnosis and Discussion:    Abdominal Pain: Based on the patient's signs and symptoms, I considered abdominal aortic aneurysm, small bowel obstruction, pancreatitis, acute cholecystitis, acute appendecitis, peptic ulcer disease, gastritis, colitis, endocrine disorders, irritable bowel syndrome and other differential diagnosis an etiology of the  patient's abdominal pain.  Diarrhea: Differential diagnosis includes but is not limited to malabsorption syndrome, bacterial infection, carcinoid syndrome, pancreatic hypersecretion, viral infection, celiac sprue, Crohn's disease, ulcerative colitis, ischemic colitis, colitis, hypermotility, and irritable bowel syndrome.    All labs were reviewed and interpreted by me.  CT scan radiology impression was interpreted by me.    MDM     Patient is a 24-year-old female who presents with complaints of abdominal pain, nausea, vomiting, diarrhea.  She states that she is just not felt well for a while.  CT scan shows what appears to be a diarrheal illness.  Likely some sort of viral gastroenteritis.  She does state has been ongoing for a month but she is not overtly dehydrated this time.  Was given some fluids here with improvement in her her heart rate as well as some of her symptoms.  I do not think this is gallbladder in nature based on her presentation.  She appeared to be having more GERD type symptoms.  She does state that she has been having issues and could have some sort of irritable bowel disease.  I did recommend outpatient follow-up with gastro.  Will give Zofran as well as started on Protonix.  She is otherwise well-appearing at this time.  Will DC.                Patient Care Considerations:          Consultants/Shared Management Plan:    None    Social Determinants of Health:    Patient is independent, reliable, and has access to care.       Disposition and Care Coordination:    Discharged: The patient is suitable and stable for discharge with no need for consideration of admission.    I have explained the patient´s condition, diagnoses and treatment plan based on the information available to me at this time. I have answered questions and addressed any concerns. The patient has a good  understanding of the patient´s diagnosis, condition, and treatment plan as can be expected at this point. The vital signs have  been stable. The patient´s condition is stable and appropriate for discharge from the emergency department.      The patient will pursue further outpatient evaluation with the primary care physician or other designated or consulting physician as outlined in the discharge instructions. They are agreeable to this plan of care and follow-up instructions have been explained in detail. The patient has received these instructions in written format and have expressed an understanding of the discharge instructions. The patient is aware that any significant change in condition or worsening of symptoms should prompt an immediate return to this or the closest emergency department or call to 911.      Final diagnoses:   Abdominal pain, unspecified abdominal location   Gastroenteritis        ED Disposition       ED Disposition   Discharge    Condition   Stable    Comment   --               This medical record created using voice recognition software.             Yared Ya MD  11/30/24 [id number]

## 2024-11-30 NOTE — Clinical Note
[facility] EMERGENCY ROOM  [address] [place] [address]  Phone: [phone]  Fax: [phone]    Diamond Wilton accompanied Kyara Christianson to the emergency department on 11/30/2024. They may return to work on 12/01/2024.        Thank you for choosing [facility].    Yared Ya MD

## 2024-12-03 ENCOUNTER — TELEPHONE (OUTPATIENT)
Dept: FAMILY MEDICINE CLINIC | Facility: CLINIC | Age: 24
End: 2024-12-03

## 2024-12-03 NOTE — TELEPHONE ENCOUNTER
"    Caller: Kyara Christianson \"Hugo\"    Relationship to patient: Self    Best call back number: [phone]    PATIENT STATED HER REFERRAL TO THE [facility] DIDN'T NOT ACCEPT HER INSURANCE AND THEY GAVE HER A FEW NAMES OF PROVIDERS THAT HER INSURANCE WOULD COVER AYALA SUERO, CAROLEE LEWIS AND  BONG WEEKS.         "

## 2024-12-05 ENCOUNTER — OFFICE VISIT (OUTPATIENT)
Dept: OTOLARYNGOLOGY | Facility: CLINIC | Age: 24
End: 2024-12-05
Payer: COMMERCIAL

## 2024-12-05 ENCOUNTER — PATIENT ROUNDING (BHMG ONLY) (OUTPATIENT)
Dept: OTOLARYNGOLOGY | Facility: CLINIC | Age: 24
End: 2024-12-05

## 2024-12-05 VITALS — WEIGHT: 228.8 LBS | TEMPERATURE: 97.8 F | HEIGHT: 63 IN | BODY MASS INDEX: 40.54 KG/M2

## 2024-12-05 DIAGNOSIS — Z01.818 PREOP TESTING: Primary | ICD-10-CM

## 2024-12-05 DIAGNOSIS — J03.01 RECURRENT STREPTOCOCCAL TONSILLITIS: Primary | ICD-10-CM

## 2024-12-05 NOTE — PROGRESS NOTES
Patient Name: Kyara Christianson   Visit Date: 12/05/2024   Patient ID: [id number]  Provider: Julio Valdes MD    Sex: female  Location: [facility]   YOB: 2000  Location Address: [address] [place] [address], [address] [place] [address],?[place]?[id number]    Primary Care Provider JuliánLuann johnson, APRJILL  Location Phone: [phone]    Referring Provider: No ref. provider found        Chief Complaint  Recurrent streptococcal pharyngitis    History of Present Illness  Kyara Christianson is a 24 y.o. female who presents to [facility] for Recurrent streptococcal pharyngitis.  Patient was seen by Ms. Luann Edwardsd for evaluation recurrent streptococcal pharyngitis/tonsillitis.  Therefore patient was referred to ENT for further evaluation and management.  Patient apparently had been diagnosed with strep tonsillitis with about 5-6 episodes per year for the last 3 years.  Usually she has visits to urgent care and they treated with antibiotics with resolution.  Past Medical History:   Diagnosis Date    Anxiety     Arthritis of back     Asthma     All my childhood.    CTS (carpal tunnel syndrome)     Possibly?    Depression     Fracture of wrist     GERD (gastroesophageal reflux disease)        Past Surgical History:   Procedure Laterality Date    NO PAST SURGERIES           Current Outpatient Medications:     albuterol sulfate  (90 Base) MCG/ACT inhaler, Inhale 2 puffs Every 6 (Six) Hours As Needed for Wheezing or Shortness of Air., Disp: 8 g, Rfl: 2    cyclobenzaprine (FLEXERIL) 10 MG tablet, Take 1 tablet by mouth 3 (Three) Times a Day As Needed for Muscle Spasms., Disp: 21 tablet, Rfl: 1    diclofenac (VOLTAREN) 50 MG EC tablet, Take 1 tablet by mouth 2 (Two) Times a Day As Needed (joint pain)., Disp: 90 tablet, Rfl: 1    omeprazole (priLOSEC) 20 MG capsule, Take 1 capsule by mouth Daily., Disp: 30 capsule, Rfl: 1    ondansetron ODT (ZOFRAN-ODT) 4 MG disintegrating tablet,  "Place 1 tablet on the tongue 4 (Four) Times a Day As Needed for Nausea., Disp: 12 tablet, Rfl: 0    bisacodyl (DULCOLAX) 10 MG suppository, Insert 1 suppository into the rectum Daily. (Patient not taking: Reported on 12/5/2024), Disp: 4 suppository, Rfl: 0    ondansetron ODT (ZOFRAN-ODT) 4 MG disintegrating tablet, Place 1 tablet on the tongue 4 (Four) Times a Day As Needed for Vomiting or Nausea. (Patient not taking: Reported on 12/5/2024), Disp: 20 tablet, Rfl: 0    promethazine (PHENERGAN) 25 MG tablet, Take 0.5-1 tablets by mouth Every 6 (Six) Hours As Needed for Nausea or Vomiting. (Patient not taking: Reported on 12/5/2024), Disp: 30 tablet, Rfl: 0     No Known Allergies    Social History     Tobacco Use    Smoking status: Every Day     Current packs/day: 0.50     Average packs/day: 0.5 packs/day for 0.5 years (0.3 ttl pk-yrs)     Types: Cigarettes     Passive exposure: Current    Smokeless tobacco: Never   Vaping Use    Vaping status: Every Day    Substances: Nicotine    Devices: Disposable, Pre-filled pod   Substance Use Topics    Alcohol use: Not Currently    Drug use: Never        Objective     Vital Signs:   Vitals:    12/05/24 0754   Temp: 97.8 °F (36.6 °C)   TempSrc: Temporal   Weight: 104 kg (228 lb 12.8 oz)   Height: 160 cm (63\")       Tobacco Use: High Risk (12/5/2024)    Patient History     Smoking Tobacco Use: Every Day     Smokeless Tobacco Use: Never     Passive Exposure: Current         Physical Exam    Constitutional   Appearance  well developed, well-nourished, alert and in no acute distress, voice clear and strong    Head   Inspection  no deformities or lesions      Face   Inspection  no facial lesions; House-Brackmann I/VI bilaterally   Palpation  no TMJ crepitus nor  muscle tenderness bilaterally     Eyes/Vision   Visual Fields  extraocular movements are intact, no spontaneous or gaze-induced nystagmus  Conjunctivae  clear   Sclerae  clear   Pupils and Irises  pupils equal, round, " and reactive to light.   Nystagmus  not present     Ears, Nose, Mouth and Throat  Ears  External Ears  appearance within normal limits, no lesions present   Otoscopic Examination  tympanic membrane appearance within normal limits bilaterally without perforations, well-aerated middle ears   Hearing  intact to conversational voice both ears   Tunning fork testing    Rinne:  Coates:    Nose  External Nose  appearance normal   Intranasal Exam  mucosa within normal limits, vestibules normal, no intranasal lesions present, septum midline, sinuses non tender to percussion   Modified Mich Test:    Oral Cavity  Oral Mucosa  oral mucosa normal without pallor or cyanosis   Lips  lip appearance normal   Teeth  normal dentition for age   Gums  gums pink, non-swollen, no bleeding present   Tongue  tongue appearance normal; normal mobility   Palate  hard palate normal, soft palate appearance normal with symmetric mobility     Throat  Oropharynx  no inflammation or lesions present, 2-3+ tonsils within normal limits   Hypopharynx  appearance within normal limits   Larynx  voice normal     Neck  Inspection/Palpation  normal appearance, no masses or tenderness, trachea midline; thyroid size normal, nontender, no nodules or masses present on palpation     Lymphatic  Neck  no lymphadenopathy present   Supraclavicular Nodes  no lymphadenopathy present   Preauricular Nodes  no lymphadenopathy present     Respiratory  Respiratory Effort  breathing unlabored   Inspection of Chest  normal appearance, no retractions     Musculoskeletal   Cervical back: Normal range of motion and neck supple.      Skin and Subcutaneous Tissue  General Inspection  Regarding face and neck - there are no rashes present, no lesions present, and no areas of discoloration     Neurologic  Cranial Nerves  cranial nerves II-XII are grossly intact bilaterally   Gait and Station  normal gait, able to stand without diffculty    Psychiatric  Judgement and  "Insight  judgment and insight intact   Mood and Affect  mood normal, affect appropriate       RESULTS REVIEWED    I have reviewed the following information:   [x]  Previous Internal Note  [x]  Previous External Note:   [x]  Ordered Tests & Results:      Pathology: No results found for: \"MICRO\"    TSH   Date Value Ref Range Status   07/26/2024 1.120 0.270 - 4.200 uIU/mL Final   09/12/2019 1.390 0.27 - 4.20 u(iU)/mL Final     Free T4   Date Value Ref Range Status   07/26/2024 1.07 0.92 - 1.68 ng/dL Final     Calcium   Date Value Ref Range Status   11/30/2024 8.8 8.6 - 10.5 mg/dL Final   09/28/2020 9.4 8.4 - 10.2 mg/dL Final       CT Abdomen Pelvis With Contrast    Result Date: 11/30/2024  Impression: 1.Gas fluid levels throughout the colon suggesting diarrheal illness. No evidence of bowel obstruction. 2.Normal appendix. Electronically Signed: Lito Cuellar MD  11/30/2024 10:37 AM EST  Workstation ID: [id number]    XR Abdomen 2+ VW with Chest 1 VW    Result Date: 11/19/2024  Impression: 1. Rectosigmoid colon fecal impaction. There is increased stool burden in the hepatic flexure and transverse colon. 2. Nonspecific air-fluid levels. There are no dilated loops of bowel to indicate an obstructive process. 3. No active cardiopulmonary disease. Electronically Signed: Rock Guaman MD  11/19/2024 8:57 AM EST  Workstation ID: [id number]      I have discussed the interpretation of the above results with the patient.    Procedures          Assessment and Plan   Diagnoses and all orders for this visit:    1. Recurrent streptococcal tonsillitis (Primary)  -     Case Request; Standing  -     Follow Anesthesia Guidelines / Protocol; Standing  -     Case Request        (J03.01) Recurrent streptococcal tonsillitis - Plan: Case Request, Follow Anesthesia Guidelines / Protocol, Case Request     Kyara Christianson  reports that she has been smoking cigarettes. She has a 0.3 pack-year smoking history. She has been exposed to tobacco smoke. She " has never used smokeless tobacco.         Plan:  Patient Instructions   1.  Patient has history of chronic recurrent strep tonsillitis with more than 5 episodes for the past 3 years which has been met beyond criteria for tonsillectomy.  2.  I recommend tonsillectomy and possible adenoidectomy with nasopharyngoscopy.    TONSILLECTOMY AND ADENOIDECTOMY: A tonsillectomy and adenoidectomy were recommended. The risks and benefits were explained including but not limited to early and late bleeding, infection, risks of the general anesthesia, dysphagia and poor PO intake, and voice change/VPI.  Alternatives were discussed. The patient/parents understood these risks and wanted to proceed. Questions were asked appropriately answered.       NASOPHARYNGOSCOPY;  The risks and benefits of nasopharyngoscopy were explained including but not limited to infection, bleeding, scar, and voice change Questions were answered. No guarantees were made or implied.        Follow Up   Return Postop follow-up at 3 weeks.  Patient was given instructions and counseling regarding her condition or for health maintenance advice. Please see specific information pulled into the AVS if appropriate.

## 2024-12-05 NOTE — PROGRESS NOTES
A [employer] message has been send to the patient for PATIENT ROUNDING with [facility].

## 2024-12-05 NOTE — PATIENT INSTRUCTIONS
1.  Patient has history of chronic recurrent strep tonsillitis with more than 5 episodes for the past 3 years which has been met beyond criteria for tonsillectomy.  2.  I recommend tonsillectomy and possible adenoidectomy with nasopharyngoscopy.    TONSILLECTOMY AND ADENOIDECTOMY: A tonsillectomy and adenoidectomy were recommended. The risks and benefits were explained including but not limited to early and late bleeding, infection, risks of the general anesthesia, dysphagia and poor PO intake, and voice change/VPI.  Alternatives were discussed. The patient/parents understood these risks and wanted to proceed. Questions were asked appropriately answered.       NASOPHARYNGOSCOPY;  The risks and benefits of nasopharyngoscopy were explained including but not limited to infection, bleeding, scar, and voice change Questions were answered. No guarantees were made or implied.

## 2024-12-12 ENCOUNTER — HOSPITAL ENCOUNTER (OUTPATIENT)
Dept: ULTRASOUND IMAGING | Facility: HOSPITAL | Age: 24
Discharge: HOME | End: 2024-12-12
Admitting: FAMILY MEDICINE
Payer: COMMERCIAL

## 2024-12-12 DIAGNOSIS — R10.11 RIGHT UPPER QUADRANT ABDOMINAL PAIN: ICD-10-CM

## 2024-12-12 DIAGNOSIS — R19.7 DIARRHEA OF PRESUMED INFECTIOUS ORIGIN: ICD-10-CM

## 2024-12-12 PROCEDURE — 76705 ECHO EXAM OF ABDOMEN: CPT

## 2024-12-20 ENCOUNTER — LAB (OUTPATIENT)
Dept: LAB | Facility: HOSPITAL | Age: 24
End: 2024-12-20
Payer: COMMERCIAL

## 2024-12-20 DIAGNOSIS — Z01.818 PREOP TESTING: ICD-10-CM

## 2024-12-20 LAB
APTT PPP: 28.2 SECONDS (ref 24.2–34.2)
B-HCG UR QL: NEGATIVE
BASOPHILS # BLD AUTO: 0.09 10*3/MM3 (ref 0–0.2)
BASOPHILS NFR BLD AUTO: 0.9 % (ref 0–1.5)
DEPRECATED RDW RBC AUTO: 41.5 FL (ref 37–54)
EOSINOPHIL # BLD AUTO: 0.33 10*3/MM3 (ref 0–0.4)
EOSINOPHIL NFR BLD AUTO: 3.4 % (ref 0.3–6.2)
ERYTHROCYTE [DISTWIDTH] IN BLOOD BY AUTOMATED COUNT: 12.4 % (ref 12.3–15.4)
HCT VFR BLD AUTO: 43.3 % (ref 34–46.6)
HGB BLD-MCNC: 15 G/DL (ref 12–15.9)
IMM GRANULOCYTES # BLD AUTO: 0.04 10*3/MM3 (ref 0–0.05)
IMM GRANULOCYTES NFR BLD AUTO: 0.4 % (ref 0–0.5)
INR PPP: 0.96 (ref 0.86–1.15)
LYMPHOCYTES # BLD AUTO: 2.3 10*3/MM3 (ref 0.7–3.1)
LYMPHOCYTES NFR BLD AUTO: 23.5 % (ref 19.6–45.3)
MCH RBC QN AUTO: 31.7 PG (ref 26.6–33)
MCHC RBC AUTO-ENTMCNC: 34.6 G/DL (ref 31.5–35.7)
MCV RBC AUTO: 91.5 FL (ref 79–97)
MONOCYTES # BLD AUTO: 0.6 10*3/MM3 (ref 0.1–0.9)
MONOCYTES NFR BLD AUTO: 6.1 % (ref 5–12)
NEUTROPHILS NFR BLD AUTO: 6.42 10*3/MM3 (ref 1.7–7)
NEUTROPHILS NFR BLD AUTO: 65.7 % (ref 42.7–76)
NRBC BLD AUTO-RTO: 0 /100 WBC (ref 0–0.2)
PLATELET # BLD AUTO: 331 10*3/MM3 (ref 140–450)
PMV BLD AUTO: 10.7 FL (ref 6–12)
PROTHROMBIN TIME: 13 SECONDS (ref 11.8–14.9)
RBC # BLD AUTO: 4.73 10*6/MM3 (ref 3.77–5.28)
WBC NRBC COR # BLD AUTO: 9.78 10*3/MM3 (ref 3.4–10.8)

## 2024-12-20 PROCEDURE — 85730 THROMBOPLASTIN TIME PARTIAL: CPT

## 2024-12-20 PROCEDURE — 36415 COLL VENOUS BLD VENIPUNCTURE: CPT

## 2024-12-20 PROCEDURE — 85025 COMPLETE CBC W/AUTO DIFF WBC: CPT

## 2024-12-20 PROCEDURE — 85610 PROTHROMBIN TIME: CPT

## 2024-12-20 PROCEDURE — 81025 URINE PREGNANCY TEST: CPT

## 2025-01-02 ENCOUNTER — TELEPHONE (OUTPATIENT)
Dept: OTOLARYNGOLOGY | Facility: CLINIC | Age: 25
End: 2025-01-02
Payer: COMMERCIAL

## 2025-01-02 NOTE — TELEPHONE ENCOUNTER
LM to return office call regarding surgery scheduled for 01/03/2025 patient called to let us know she is sick wants to know if she needs to reschedule

## 2025-01-14 ENCOUNTER — TELEPHONE (OUTPATIENT)
Dept: FAMILY MEDICINE CLINIC | Facility: CLINIC | Age: 25
End: 2025-01-14

## 2025-01-16 ENCOUNTER — TELEPHONE (OUTPATIENT)
Dept: OTOLARYNGOLOGY | Facility: CLINIC | Age: 25
End: 2025-01-16
Payer: COMMERCIAL

## 2025-01-16 NOTE — TELEPHONE ENCOUNTER
Spoke to patient and cancelled her surgery scheduled for 01/17/2025 due to testing positive for Covid today per Dr Valdes. Offered to reschedule surgery for a couple weeks out and she stated she would call us back later to reschedule when she is feeling better because she isn't sure how long it will take her to get over this.

## 2025-04-18 ENCOUNTER — TELEPHONE (OUTPATIENT)
Dept: GASTROENTEROLOGY | Facility: CLINIC | Age: 25
End: 2025-04-18
Payer: COMMERCIAL

## 2025-04-18 NOTE — TELEPHONE ENCOUNTER
Attempted to contact/Contacted Kyara Christianson 2000 regarding the appointment no show with NAZ Young on 04.17.25 @ 1:30. Patient is aware that there is a 24-hour cancellation policy and understands that a no-show letter will be mailed to them at the address on file. [facility]   Well positioned.

## 2025-05-01 ENCOUNTER — OFFICE VISIT (OUTPATIENT)
Dept: FAMILY MEDICINE CLINIC | Facility: CLINIC | Age: 25
End: 2025-05-01
Payer: COMMERCIAL

## 2025-05-01 VITALS
HEIGHT: 63 IN | HEART RATE: 100 BPM | SYSTOLIC BLOOD PRESSURE: 112 MMHG | DIASTOLIC BLOOD PRESSURE: 82 MMHG | BODY MASS INDEX: 40.04 KG/M2 | WEIGHT: 226 LBS | OXYGEN SATURATION: 98 %

## 2025-05-01 DIAGNOSIS — E66.01 MORBID (SEVERE) OBESITY DUE TO EXCESS CALORIES: ICD-10-CM

## 2025-05-01 DIAGNOSIS — Z23 NEED FOR MENINGOCOCCAL VACCINATION: ICD-10-CM

## 2025-05-01 DIAGNOSIS — Z13.29 SCREENING FOR THYROID DISORDER: ICD-10-CM

## 2025-05-01 DIAGNOSIS — N92.6 ABNORMAL MENSTRUAL PERIODS: ICD-10-CM

## 2025-05-01 DIAGNOSIS — Z13.220 SCREENING FOR LIPID DISORDERS: ICD-10-CM

## 2025-05-01 DIAGNOSIS — Z00.00 ANNUAL PHYSICAL EXAM: Primary | ICD-10-CM

## 2025-05-01 NOTE — PROGRESS NOTES
"Chief Complaint  Annual Exam and Obesity    SUBJECTIVE  Kyara Christianson presents to [facility]    History of Present Illness  Patient is 24-year-old female who presents today for annual physical exam.     Men B vax- today    PAP- will schedule for future visit.    Obesity. Would like to explore weight loss options. She got a RX for semaglutide compound through online service that caused constipation and abd pain. She did lose 30 lbs in 1 month but valles it back.  She has been trying diet and exercise for several months with no success. She has concerns for PCOS, she had abnormal periods, skips some months. Reports family hx of PCOS.     Patient is due labs. Orders placed at today's visit. Discussed with patient that these are fasting labs.     No other concerns or complaints at this time.  Patient denies any diarrhea, constipation, blood in stool, urinary urgency, frequency, or dysuria, chest pain, shortness of breath or syncope.         Past Medical History:   Diagnosis Date    Anxiety     Arthritis of back     Asthma     PRN INHALERS    CTS (carpal tunnel syndrome)     Possibly?    Depression     Fracture of wrist     GERD (gastroesophageal reflux disease)     Recurrent streptococcal pharyngitis       Family History   Problem Relation Age of Onset    Hypertension Mother     Mental illness Mother     Diabetes Mother     Kidney failure Mother     Depression Mother     Drug abuse Mother     Anxiety disorder Mother     Arthritis Mother     Hyperlipidemia Mother     Kidney disease Mother     Migraines Sister       Past Surgical History:   Procedure Laterality Date    NO PAST SURGERIES        No current outpatient medications on file.    Current Facility-Administered Medications:     Meningococcal B (BEXSERO) intramuscular vaccine 0.5 mL, 0.5 mL, Intramuscular, Once, Luann Gallego, APRN    OBJECTIVE  Vital Signs:   /82   Pulse 100   Ht 160 cm (63\")   Wt 103 kg (226 lb)   " "SpO2 98%   BMI 40.03 kg/m²    Estimated body mass index is 40.03 kg/m² as calculated from the following:    Height as of this encounter: 160 cm (63\").    Weight as of this encounter: 103 kg (226 lb).     Wt Readings from Last 3 Encounters:   05/01/25 103 kg (226 lb)   01/16/25 103 kg (228 lb)   12/30/24 103 kg (228 lb)     BP Readings from Last 3 Encounters:   05/01/25 112/82   03/03/25 132/71   01/16/25 129/87       Physical Exam  Vitals reviewed.   Constitutional:       General: She is awake. She is not in acute distress.     Appearance: She is well-groomed. She is obese. She is not ill-appearing.   HENT:      Head: Normocephalic and atraumatic.      Right Ear: Tympanic membrane, ear canal and external ear normal.      Left Ear: Tympanic membrane, ear canal and external ear normal.      Nose: Nose normal.      Mouth/Throat:      Mouth: Mucous membranes are moist.      Pharynx: Oropharynx is clear. No oropharyngeal exudate or posterior oropharyngeal erythema.   Eyes:      Extraocular Movements: Extraocular movements intact.      Conjunctiva/sclera: Conjunctivae normal.      Pupils: Pupils are equal, round, and reactive to light.   Neck:      Thyroid: No thyroid mass, thyromegaly or thyroid tenderness.   Cardiovascular:      Rate and Rhythm: Normal rate and regular rhythm.      Heart sounds: Normal heart sounds.   Pulmonary:      Effort: Pulmonary effort is normal.      Breath sounds: Normal breath sounds.   Abdominal:      General: Abdomen is flat. Bowel sounds are normal. There is no distension.      Palpations: Abdomen is soft.      Tenderness: There is no abdominal tenderness.   Musculoskeletal:         General: Normal range of motion.      Cervical back: Normal range of motion and neck supple. No rigidity or tenderness.   Lymphadenopathy:      Cervical: No cervical adenopathy.   Skin:     General: Skin is warm and dry.   Neurological:      Mental Status: She is alert and oriented to person, place, and time. "   Psychiatric:         Mood and Affect: Mood normal.         Behavior: Behavior normal. Behavior is cooperative.         Thought Content: Thought content normal.         Judgment: Judgment normal.          Result Review    Common labs          8/19/2024    21:30 11/30/2024    09:24 12/20/2024    12:33   Common Labs   Glucose 87  97     BUN 9  8     Creatinine 0.73  0.67     Sodium 138  138     Potassium 4.1  3.9     Chloride 102  104     Calcium 9.2  8.8     Albumin 4.1  4.1     Total Bilirubin 0.3  0.4     Alkaline Phosphatase 66  61     AST (SGOT) 19  13     ALT (SGPT) 18  15     WBC 8.77  13.91  9.78    Hemoglobin 14.6  14.7  15.0    Hematocrit 41.4  41.9  43.3    Platelets 284  297  331        No Images in the past 120 days found..      The above data has been reviewed by NAZ Yanez 05/01/2025 14:19 EDT.          Patient Care Team:  Luann Gallego APRN as PCP - General (Nurse Practitioner)            ASSESSMENT & PLAN    Diagnoses and all orders for this visit:    1. Annual physical exam (Primary)  Comments:  preventative counseling  healthy diet  daily exercise  get adequate sleep  Orders:  -     Comprehensive Metabolic Panel; Future  -     CBC & Differential; Future  -     Lipid Panel; Future  -     TSH+Free T4; Future  -     Meningococcal B (BEXSERO) intramuscular vaccine 0.5 mL    2. Screening for lipid disorders  -     Lipid Panel; Future    3. Screening for thyroid disorder  -     TSH+Free T4; Future    4. Morbid (severe) obesity due to excess calories  Comments:  will assess labs to determine underlying cause and treat if needed, come back in1 week for review of options  Orders:  -     Hemoglobin A1c; Future    5. Need for meningococcal vaccination  -     Meningococcal B (BEXSERO) intramuscular vaccine 0.5 mL    6. Abnormal menstrual periods  Comments:  PCOS workup ordered  Orders:  -     17-Hydroxyprogesterone; Future  -     Testosterone; Future  -     Estrogens, Total; Future  -     Follicle  Stimulating Hormone; Future  -     Luteinizing Hormone; Future  -     Progesterone; Future  -     Hemoglobin A1c; Future         Tobacco Use: High Risk (5/1/2025)    Patient History     Smoking Tobacco Use: Every Day     Smokeless Tobacco Use: Never     Passive Exposure: Current       Follow Up     Return in about 1 week (around 5/8/2025) for lab review and weight loss med discussion.      Patient was given instructions and counseling regarding her condition or for health maintenance advice. Please see specific information pulled into the AVS if appropriate.   I have reviewed information obtained and documented by others and I have confirmed the accuracy of this documented note.    NAZ Yanez

## 2025-05-01 NOTE — LETTER
[facility]  Vaccine Consent Form    Patient Name:  Kyara Christianson  Patient :  2000        Screening Checklist  The following questions should be completed prior to vaccination. If you answer “yes” to any question, it does not necessarily mean you should not be vaccinated. It just means we may need to clarify or ask more questions. If a question is unclear, please ask your healthcare provider to explain it.    Yes No   Any fever or moderate to severe illness today (mild illness and/or antibiotic treatment are not contraindications)?     Do you have a history of a serious reaction to any previous vaccinations, such as anaphylaxis, encephalopathy within 7 days, Guillain-Woodston syndrome within 6 weeks, seizure?     Have you received any live vaccine(s) (e.g MMR, DILIA) or any other vaccines in the last month (to ensure duplicate doses aren't given)?     Do you have an anaphylactic allergy to latex (DTaP, DTaP-IPV, Hep A, Hep B, MenB, RV, Td, Tdap), baker’s yeast (Hep B, HPV), polysorbates (RSV, nirsevimab, PCV 20, Rotavirrus, Tdap, Shingrix), or gelatin (DILIA, MMR)?     Do you have an anaphylactic allergy to neomycin (Rabies, DILIA, MMR, IPV, Hep A), polymyxin B (IPV), or streptomycin (IPV)?      Any cancer, leukemia, AIDS, or other immune system disorder? (DILIA, MMR, RV)     Do you have a parent, brother, or sister with an immune system problem (if immune competence of vaccine recipient clinically verified, can proceed)? (MMR, DILIA)     Any recent steroid treatments for >2 weeks, chemotherapy, or radiation treatment? (DILIA, MMR)     Have you received antibody-containing blood transfusions or IVIG in the past 11 months (recommended interval is dependent on product)? (MMR, DILIA)     Have you taken antiviral drugs (acyclovir, famciclovir, valacyclovir for DILIA) in the last 24 or 48 hours, respectively?      Are you pregnant or planning to become pregnant within 1 month? (DILIA, MMR, HPV, IPV, MenB, Abrexvy; For Hep B-  "refer to Engerix-B; For RSV - Abrysvo is indicated for 32-36 weeks of pregnancy from September to January)     For infants, have you ever been told your child has had intussusception or a medical emergency involving obstruction of the intestine (Rotavirus)? If not for an infant, can skip this question.         *Ordering Physicians/APC should be consulted if \"yes\" is checked by the patient or guardian above.  I have received, read, and understand the Vaccine Information Statement (VIS) for each vaccine ordered.  I have considered my or my child's health status as well as the health status of my close contacts.  I have taken the opportunity to discuss my vaccine questions with my or my child's health care provider.   I have requested that the ordered vaccine(s) be given to me or my child.  I understand the benefits and risks of the vaccines.  I understand that I should remain in the clinic for 15 minutes after receiving the vaccine(s).  _________________________________________________________  Signature of Patient or Parent/Legal Guardian ____________________  Date     "

## 2025-05-02 ENCOUNTER — LAB (OUTPATIENT)
Dept: LAB | Facility: HOSPITAL | Age: 25
End: 2025-05-02
Payer: COMMERCIAL

## 2025-05-02 DIAGNOSIS — N92.6 ABNORMAL MENSTRUAL PERIODS: ICD-10-CM

## 2025-05-02 DIAGNOSIS — Z00.00 ANNUAL PHYSICAL EXAM: ICD-10-CM

## 2025-05-02 DIAGNOSIS — Z13.29 SCREENING FOR THYROID DISORDER: ICD-10-CM

## 2025-05-02 DIAGNOSIS — Z13.220 SCREENING FOR LIPID DISORDERS: ICD-10-CM

## 2025-05-02 DIAGNOSIS — E66.01 MORBID (SEVERE) OBESITY DUE TO EXCESS CALORIES: ICD-10-CM

## 2025-05-02 LAB
ALBUMIN SERPL-MCNC: 4.3 G/DL (ref 3.5–5.2)
ALBUMIN/GLOB SERPL: 1.3 G/DL
ALP SERPL-CCNC: 59 U/L (ref 39–117)
ALT SERPL W P-5'-P-CCNC: 17 U/L (ref 1–33)
ANION GAP SERPL CALCULATED.3IONS-SCNC: 11 MMOL/L (ref 5–15)
AST SERPL-CCNC: 17 U/L (ref 1–32)
BASOPHILS # BLD AUTO: 0.08 10*3/MM3 (ref 0–0.2)
BASOPHILS NFR BLD AUTO: 0.8 % (ref 0–1.5)
BILIRUB SERPL-MCNC: 0.3 MG/DL (ref 0–1.2)
BUN SERPL-MCNC: 12 MG/DL (ref 6–20)
BUN/CREAT SERPL: 16.7 (ref 7–25)
CALCIUM SPEC-SCNC: 9 MG/DL (ref 8.6–10.5)
CHLORIDE SERPL-SCNC: 107 MMOL/L (ref 98–107)
CHOLEST SERPL-MCNC: 133 MG/DL (ref 0–200)
CO2 SERPL-SCNC: 20 MMOL/L (ref 22–29)
CREAT SERPL-MCNC: 0.72 MG/DL (ref 0.57–1)
DEPRECATED RDW RBC AUTO: 41.9 FL (ref 37–54)
EGFRCR SERPLBLD CKD-EPI 2021: 120.7 ML/MIN/1.73
EOSINOPHIL # BLD AUTO: 0.24 10*3/MM3 (ref 0–0.4)
EOSINOPHIL NFR BLD AUTO: 2.4 % (ref 0.3–6.2)
ERYTHROCYTE [DISTWIDTH] IN BLOOD BY AUTOMATED COUNT: 12.7 % (ref 12.3–15.4)
FSH SERPL-ACNC: 4.03 MIU/ML
GLOBULIN UR ELPH-MCNC: 3.2 GM/DL
GLUCOSE SERPL-MCNC: 83 MG/DL (ref 65–99)
HBA1C MFR BLD: 5 % (ref 4.8–5.6)
HCT VFR BLD AUTO: 44.9 % (ref 34–46.6)
HDLC SERPL-MCNC: 35 MG/DL (ref 40–60)
HGB BLD-MCNC: 15.7 G/DL (ref 12–15.9)
IMM GRANULOCYTES # BLD AUTO: 0.03 10*3/MM3 (ref 0–0.05)
IMM GRANULOCYTES NFR BLD AUTO: 0.3 % (ref 0–0.5)
LDLC SERPL CALC-MCNC: 82 MG/DL (ref 0–100)
LDLC/HDLC SERPL: 2.35 {RATIO}
LH SERPL-ACNC: 5.82 MIU/ML
LYMPHOCYTES # BLD AUTO: 2.67 10*3/MM3 (ref 0.7–3.1)
LYMPHOCYTES NFR BLD AUTO: 27.1 % (ref 19.6–45.3)
MCH RBC QN AUTO: 31.5 PG (ref 26.6–33)
MCHC RBC AUTO-ENTMCNC: 35 G/DL (ref 31.5–35.7)
MCV RBC AUTO: 90 FL (ref 79–97)
MONOCYTES # BLD AUTO: 0.65 10*3/MM3 (ref 0.1–0.9)
MONOCYTES NFR BLD AUTO: 6.6 % (ref 5–12)
NEUTROPHILS NFR BLD AUTO: 6.2 10*3/MM3 (ref 1.7–7)
NEUTROPHILS NFR BLD AUTO: 62.8 % (ref 42.7–76)
NRBC BLD AUTO-RTO: 0 /100 WBC (ref 0–0.2)
PLATELET # BLD AUTO: 315 10*3/MM3 (ref 140–450)
PMV BLD AUTO: 11 FL (ref 6–12)
POTASSIUM SERPL-SCNC: 3.8 MMOL/L (ref 3.5–5.2)
PROGEST SERPL-MCNC: 0.66 NG/ML
PROT SERPL-MCNC: 7.5 G/DL (ref 6–8.5)
RBC # BLD AUTO: 4.99 10*6/MM3 (ref 3.77–5.28)
SODIUM SERPL-SCNC: 138 MMOL/L (ref 136–145)
T4 FREE SERPL-MCNC: 0.96 NG/DL (ref 0.92–1.68)
TESTOST SERPL-MCNC: 21.1 NG/DL (ref 8.4–48.1)
TRIGL SERPL-MCNC: 79 MG/DL (ref 0–150)
TSH SERPL DL<=0.05 MIU/L-ACNC: 2.39 UIU/ML (ref 0.27–4.2)
VLDLC SERPL-MCNC: 16 MG/DL (ref 5–40)
WBC NRBC COR # BLD AUTO: 9.87 10*3/MM3 (ref 3.4–10.8)

## 2025-05-02 PROCEDURE — 84403 ASSAY OF TOTAL TESTOSTERONE: CPT

## 2025-05-02 PROCEDURE — 80061 LIPID PANEL: CPT

## 2025-05-02 PROCEDURE — 84144 ASSAY OF PROGESTERONE: CPT

## 2025-05-02 PROCEDURE — 36415 COLL VENOUS BLD VENIPUNCTURE: CPT

## 2025-05-02 PROCEDURE — 83001 ASSAY OF GONADOTROPIN (FSH): CPT

## 2025-05-02 PROCEDURE — 83002 ASSAY OF GONADOTROPIN (LH): CPT

## 2025-05-02 PROCEDURE — 84439 ASSAY OF FREE THYROXINE: CPT

## 2025-05-02 PROCEDURE — 82672 ASSAY OF ESTROGEN: CPT

## 2025-05-02 PROCEDURE — 83498 ASY HYDROXYPROGESTERONE 17-D: CPT

## 2025-05-02 PROCEDURE — 83036 HEMOGLOBIN GLYCOSYLATED A1C: CPT

## 2025-05-02 PROCEDURE — 80053 COMPREHEN METABOLIC PANEL: CPT

## 2025-05-02 PROCEDURE — 85025 COMPLETE CBC W/AUTO DIFF WBC: CPT

## 2025-05-02 PROCEDURE — 84443 ASSAY THYROID STIM HORMONE: CPT

## 2025-05-06 LAB — ESTROGEN SERPL-MCNC: 125 PG/ML

## 2025-05-07 ENCOUNTER — RESULTS FOLLOW-UP (OUTPATIENT)
Dept: LAB | Facility: HOSPITAL | Age: 25
End: 2025-05-07
Payer: COMMERCIAL

## 2025-05-08 LAB — 17OHP SERPL-MCNC: 112 NG/DL

## 2025-05-09 NOTE — TELEPHONE ENCOUNTER
Patient informed and voiced understanding of lab results.  Patient already has appt scheduled 05/14/2025.

## 2025-05-23 ENCOUNTER — OFFICE VISIT (OUTPATIENT)
Dept: FAMILY MEDICINE CLINIC | Facility: CLINIC | Age: 25
End: 2025-05-23
Payer: COMMERCIAL

## 2025-05-23 VITALS
SYSTOLIC BLOOD PRESSURE: 127 MMHG | DIASTOLIC BLOOD PRESSURE: 80 MMHG | BODY MASS INDEX: 40.03 KG/M2 | HEART RATE: 80 BPM | OXYGEN SATURATION: 100 % | WEIGHT: 226 LBS

## 2025-05-23 DIAGNOSIS — N92.6 ABNORMAL MENSTRUAL CYCLE: ICD-10-CM

## 2025-05-23 DIAGNOSIS — E66.01 MORBID (SEVERE) OBESITY DUE TO EXCESS CALORIES: Primary | ICD-10-CM

## 2025-05-23 RX ORDER — METFORMIN HYDROCHLORIDE 500 MG/1
500 TABLET, EXTENDED RELEASE ORAL
Qty: 90 TABLET | Refills: 0 | Status: SHIPPED | OUTPATIENT
Start: 2025-05-23

## 2025-05-23 NOTE — PROGRESS NOTES
Chief Complaint  Obesity    SUBJECTIVE  Kyara Christianson presents to [facility]    History of Present Illness  Patient is 24-year-old female who presents today for follow up for lab work and to discuss weight loss medication.     Patient had labs on 5/2/25. She has had issues with losing weight. Reports a family hx of PCOS. She does have abnormal menstrual cycle patterns. All female hormones were at normal levels. Testosterone, lipids and A1c normal. Her LMP was May 3rd, the day after labs were done so she was in the end of luteal transitioning into follicular so progesterone may have been minimally low. She has been trying diet and exercise and with no success. She had done GLP-1 compound through online service but GI side effects were too severe. She does not want to try phentermine due to family hx of substance abuse, no personal hx. She has tried and failed Wellbutrin in the past. Side effects of Topamax discussed and is worrisome for her. She is willing to try metformin.         Past Medical History:   Diagnosis Date    Anxiety     Arthritis of back     Asthma     PRN INHALERS    CTS (carpal tunnel syndrome)     Possibly?    Depression     Fracture of wrist     GERD (gastroesophageal reflux disease)     Recurrent streptococcal pharyngitis       Family History   Problem Relation Age of Onset    Hypertension Mother     Mental illness Mother     Diabetes Mother     Kidney failure Mother     Depression Mother     Drug abuse Mother     Anxiety disorder Mother     Arthritis Mother     Hyperlipidemia Mother     Kidney disease Mother     Migraines Sister       Past Surgical History:   Procedure Laterality Date    NO PAST SURGERIES          Current Outpatient Medications:     metFORMIN ER (GLUCOPHAGE-XR) 500 MG 24 hr tablet, Take 1 tablet by mouth Daily With Breakfast., Disp: 90 tablet, Rfl: 0    OBJECTIVE  Vital Signs:   /80 (BP Location: Left arm, Patient Position: Sitting, Cuff  "Size: Large Adult)   Pulse 80   Wt 103 kg (226 lb)   SpO2 100%   BMI 40.03 kg/m²    Estimated body mass index is 40.03 kg/m² as calculated from the following:    Height as of 5/1/25: 160 cm (63\").    Weight as of this encounter: 103 kg (226 lb).     Wt Readings from Last 3 Encounters:   05/23/25 103 kg (226 lb)   05/01/25 103 kg (226 lb)   01/16/25 103 kg (228 lb)     BP Readings from Last 3 Encounters:   05/23/25 127/80   05/01/25 112/82   03/03/25 132/71       Physical Exam  Vitals reviewed.   Constitutional:       General: She is not in acute distress.     Appearance: She is morbidly obese. She is not ill-appearing.   HENT:      Head: Normocephalic and atraumatic.   Eyes:      Conjunctiva/sclera: Conjunctivae normal.   Cardiovascular:      Rate and Rhythm: Normal rate and regular rhythm.      Heart sounds: Normal heart sounds.   Pulmonary:      Effort: Pulmonary effort is normal.      Breath sounds: Normal breath sounds.   Musculoskeletal:      Cervical back: Normal range of motion.   Neurological:      Mental Status: She is alert and oriented to person, place, and time.   Psychiatric:         Mood and Affect: Mood normal.         Behavior: Behavior normal.         Thought Content: Thought content normal.         Judgment: Judgment normal.          Result Review    Common labs          11/30/2024    09:24 12/20/2024    12:33 5/2/2025    12:41   Common Labs   Glucose 97   83    BUN 8   12    Creatinine 0.67   0.72    Sodium 138   138    Potassium 3.9   3.8    Chloride 104   107    Calcium 8.8   9.0    Albumin 4.1   4.3    Total Bilirubin 0.4   0.3    Alkaline Phosphatase 61   59    AST (SGOT) 13   17    ALT (SGPT) 15   17    WBC 13.91  9.78  9.87    Hemoglobin 14.7  15.0  15.7    Hematocrit 41.9  43.3  44.9    Platelets 297  331  315    Total Cholesterol   133    Triglycerides   79    HDL Cholesterol   35    LDL Cholesterol    82    Hemoglobin A1C   5.00        No Images in the past 120 days found..      The " above data has been reviewed by NAZ Yanez 05/23/2025 11:12 EDT.          Patient Care Team:  Luann Gallego APRN as PCP - General (Nurse Practitioner)            ASSESSMENT & PLAN    Diagnoses and all orders for this visit:    1. Morbid (severe) obesity due to excess calories (Primary)  -     metFORMIN ER (GLUCOPHAGE-XR) 500 MG 24 hr tablet; Take 1 tablet by mouth Daily With Breakfast.  Dispense: 90 tablet; Refill: 0    2. Abnormal menstrual cycle  -     metFORMIN ER (GLUCOPHAGE-XR) 500 MG 24 hr tablet; Take 1 tablet by mouth Daily With Breakfast.  Dispense: 90 tablet; Refill: 0    After discussion of medication options came to the agreement that we will try metformin.  Will send in extended release to help fully help prevent GI side effects.  Advised patient she can to pare this with diet and exercise to see weight loss.  Advised to download calorie tracking/fitness kathy.  Cut calories down to around 1500 genia with high-protein intake.  Increase exercise at least 150 minutes weekly.  Advised lower carb and lower sugar diet.  Patient metformin may also help regulate her menstrual cycles.  Advised that although labs seem unremarkable this time does not definitively exclude PCOS, discussed potential for transvaginal ultrasound in the future.    Tobacco Use: High Risk (5/23/2025)    Patient History     Smoking Tobacco Use: Every Day     Smokeless Tobacco Use: Never     Passive Exposure: Current       Follow Up     Return in about 1 month (around 6/23/2025) for Next scheduled follow up.      Patient was given instructions and counseling regarding her condition or for health maintenance advice. Please see specific information pulled into the AVS if appropriate.   I have reviewed information obtained and documented by others and I have confirmed the accuracy of this documented note.    NAZ Yanez

## 2025-05-30 ENCOUNTER — PATIENT MESSAGE (OUTPATIENT)
Dept: FAMILY MEDICINE CLINIC | Facility: CLINIC | Age: 25
End: 2025-05-30
Payer: COMMERCIAL

## 2025-05-30 DIAGNOSIS — Z72.0 TOBACCO USE: Primary | ICD-10-CM

## 2025-06-04 RX ORDER — NICOTINE 21 MG/24HR
1 PATCH, TRANSDERMAL 24 HOURS TRANSDERMAL EVERY 24 HOURS
Qty: 28 EACH | Refills: 0 | Status: SHIPPED | OUTPATIENT
Start: 2025-06-04

## 2025-06-18 ENCOUNTER — OFFICE VISIT (OUTPATIENT)
Dept: FAMILY MEDICINE CLINIC | Facility: CLINIC | Age: 25
End: 2025-06-18
Payer: COMMERCIAL

## 2025-06-18 VITALS
OXYGEN SATURATION: 98 % | DIASTOLIC BLOOD PRESSURE: 63 MMHG | WEIGHT: 228.6 LBS | BODY MASS INDEX: 40.5 KG/M2 | SYSTOLIC BLOOD PRESSURE: 138 MMHG | HEIGHT: 63 IN | HEART RATE: 86 BPM

## 2025-06-18 DIAGNOSIS — F41.9 ANXIETY: ICD-10-CM

## 2025-06-18 DIAGNOSIS — L73.9 FOLLICULITIS: Primary | ICD-10-CM

## 2025-06-18 DIAGNOSIS — F90.9 ATTENTION DEFICIT HYPERACTIVITY DISORDER (ADHD), UNSPECIFIED ADHD TYPE: ICD-10-CM

## 2025-06-18 DIAGNOSIS — F32.A DEPRESSION, UNSPECIFIED DEPRESSION TYPE: ICD-10-CM

## 2025-06-18 DIAGNOSIS — F31.32 BIPOLAR AFFECTIVE DISORDER, CURRENTLY DEPRESSED, MODERATE: ICD-10-CM

## 2025-06-18 DIAGNOSIS — F17.210 CIGARETTE NICOTINE DEPENDENCE WITHOUT COMPLICATION: ICD-10-CM

## 2025-06-18 RX ORDER — DOXYCYCLINE 100 MG/1
100 CAPSULE ORAL 2 TIMES DAILY
Qty: 10 CAPSULE | Refills: 0 | Status: SHIPPED | OUTPATIENT
Start: 2025-06-18

## 2025-06-18 NOTE — PROGRESS NOTES
Chief Complaint  Nicotine Dependence, Depression, and Cyst    SUBJECTIVE  Kyara Christianson presents to [facility]    History of Present Illness  Patient is 24-year-old female who presents today for increasing depression, mood fluctuations, smoking cessation and skin complaint.    She has a hx of bipolar disorder, MDD, DIANA,PTSD, ADHD. She grew up in residential facility. She has failed Zoloft, Lexapro, Wellbutrin, hydroxyzine and Buspar in the past. States most antidepressants caused increased anger and irritability. She had had increasing depression, low self-esteem, irritability, crying, anxiousness over the past few weeks. She is not having current SI but feels it is starting to go in that direction.     She was given nicotine patches to help quit smoking. Based on her PPD 21 mg was prescribed. She is feeling nauseated and dizzy when using, cannot keep on longer than 2 hours.     She also c/o pustules on her right breast, and groin. States they come and go, she can often express purulent drainage from them. They scar into dark areas. Right breast has been there for about 4 weeks, not going away.       Past Medical History:   Diagnosis Date    Anxiety     Arthritis of back     Asthma     PRN INHALERS    CTS (carpal tunnel syndrome)     Possibly?    Depression     Fracture of wrist     GERD (gastroesophageal reflux disease)     Recurrent streptococcal pharyngitis       Family History   Problem Relation Age of Onset    Hypertension Mother     Mental illness Mother     Diabetes Mother     Kidney failure Mother     Depression Mother     Drug abuse Mother     Anxiety disorder Mother     Arthritis Mother     Hyperlipidemia Mother     Kidney disease Mother     Migraines Sister       Past Surgical History:   Procedure Laterality Date    NO PAST SURGERIES          Current Outpatient Medications:     metFORMIN ER (GLUCOPHAGE-XR) 500 MG 24 hr tablet, Take 1 tablet by mouth Daily With  "Breakfast., Disp: 90 tablet, Rfl: 0    Cariprazine HCl (Vraylar) 1.5 MG capsule capsule, Take 1 capsule by mouth Daily for 3 days, THEN 2 capsules Daily for 27 days., Disp: 57 capsule, Rfl: 0    doxycycline (VIBRAMYCIN) 100 MG capsule, Take 1 capsule by mouth 2 (Two) Times a Day., Disp: 10 capsule, Rfl: 0    nicotine (Nicoderm CQ) 7 MG/24HR patch, Place 1 patch on the skin as directed by provider Daily., Disp: 14 each, Rfl: 2    OBJECTIVE  Vital Signs:   /63 (BP Location: Left arm, Patient Position: Sitting, Cuff Size: Large Adult)   Pulse 86   Ht 160 cm (62.99\")   Wt 104 kg (228 lb 9.6 oz)   SpO2 98%   BMI 40.50 kg/m²    Estimated body mass index is 40.5 kg/m² as calculated from the following:    Height as of this encounter: 160 cm (62.99\").    Weight as of this encounter: 104 kg (228 lb 9.6 oz).     Wt Readings from Last 3 Encounters:   06/18/25 104 kg (228 lb 9.6 oz)   05/23/25 103 kg (226 lb)   05/01/25 103 kg (226 lb)     BP Readings from Last 3 Encounters:   06/18/25 138/63   05/23/25 127/80   05/01/25 112/82       Physical Exam  Vitals reviewed.   Constitutional:       General: She is not in acute distress.     Appearance: She is not ill-appearing.   HENT:      Head: Normocephalic and atraumatic.   Eyes:      Conjunctiva/sclera: Conjunctivae normal.   Cardiovascular:      Rate and Rhythm: Normal rate and regular rhythm.      Heart sounds: Normal heart sounds.   Pulmonary:      Effort: Pulmonary effort is normal.      Breath sounds: Normal breath sounds.   Musculoskeletal:      Cervical back: Normal range of motion.   Skin:     Comments: Right breast, 1 cm hyperpigmented area that is firm and tender to touch, no active pustule but does have small opening where drainage has been expressed.     Similar hyperpigmented/scarred areas to groin folds   Neurological:      Mental Status: She is alert and oriented to person, place, and time.   Psychiatric:         Mood and Affect: Mood is anxious and " depressed.         Speech: Speech normal.         Behavior: Behavior normal.         Thought Content: Thought content normal.         Cognition and Memory: Cognition normal.         Judgment: Judgment normal.          Result Review    Common labs          11/30/2024    09:24 12/20/2024    12:33 5/2/2025    12:41   Common Labs   Glucose 97   83    BUN 8   12    Creatinine 0.67   0.72    Sodium 138   138    Potassium 3.9   3.8    Chloride 104   107    Calcium 8.8   9.0    Albumin 4.1   4.3    Total Bilirubin 0.4   0.3    Alkaline Phosphatase 61   59    AST (SGOT) 13   17    ALT (SGPT) 15   17    WBC 13.91  9.78  9.87    Hemoglobin 14.7  15.0  15.7    Hematocrit 41.9  43.3  44.9    Platelets 297  331  315    Total Cholesterol   133    Triglycerides   79    HDL Cholesterol   35    LDL Cholesterol    82    Hemoglobin A1C   5.00        No Images in the past 120 days found..      The above data has been reviewed by NAZ Yanze 06/18/2025 13:22 EDT.          Patient Care Team:  Luann Gallego APRN as PCP - General (Nurse Practitioner)            ASSESSMENT & PLAN    Diagnoses and all orders for this visit:    1. Folliculitis (Primary)  Comments:  start doxycycline to cover for staph A and possible MRSA  Orders:  -     doxycycline (VIBRAMYCIN) 100 MG capsule; Take 1 capsule by mouth 2 (Two) Times a Day.  Dispense: 10 capsule; Refill: 0    2. Cigarette nicotine dependence without complication  Comments:  decrease to 7 mg patches to see if any improvement  Orders:  -     nicotine (Nicoderm CQ) 7 MG/24HR patch; Place 1 patch on the skin as directed by provider Daily.  Dispense: 14 each; Refill: 2    3. Anxiety  Comments:  start vraylar 1.5 mg for 3 days then increase to 3 mg, ref to [facility] for evaluation and furhter treatment  Orders:  -     Ambulatory Referral to Behavioral Health  -     Cariprazine HCl (Vraylar) 1.5 MG capsule capsule; Take 1 capsule by mouth Daily for 3 days, THEN 2 capsules Daily for 27 days.   Dispense: 57 capsule; Refill: 0    4. Depression, unspecified depression type  Comments:  start vraylar 1.5 mg for 3 days then increase to 3 mg, [facility] and furhter treatment  Orders:  -     Ambulatory Referral to Behavioral Health  -     Cariprazine HCl (Vraylar) 1.5 MG capsule capsule; Take 1 capsule by mouth Daily for 3 days, THEN 2 capsules Daily for 27 days.  Dispense: 57 capsule; Refill: 0    5. Bipolar affective disorder, currently depressed, moderate  Comments:  start vraylar 1.5 mg for 3 days then increase to 3 mg, ref to pscyh for evaluation and furhter treatment  Orders:  -     Cariprazine HCl (Vraylar) 1.5 MG capsule capsule; Take 1 capsule by mouth Daily for 3 days, THEN 2 capsules Daily for 27 days.  Dispense: 57 capsule; Refill: 0    6. Attention deficit hyperactivity disorder (ADHD), unspecified ADHD type  Comments:  ref to psych for evaluation and further treatment  Orders:  -     Ambulatory Referral to Behavioral Health  -     Cariprazine HCl (Vraylar) 1.5 MG capsule capsule; Take 1 capsule by mouth Daily for 3 days, THEN 2 capsules Daily for 27 days.  Dispense: 57 capsule; Refill: 0         Tobacco Use: High Risk (6/18/2025)    Patient History     Smoking Tobacco Use: Every Day     Smokeless Tobacco Use: Never     Passive Exposure: Current       Follow Up     Return in about 2 weeks (around 7/2/2025) for Next scheduled follow up.      Patient was given instructions and counseling regarding her condition or for health maintenance advice. Please see specific information pulled into the AVS if appropriate.   I have reviewed information obtained and documented by others and I have confirmed the accuracy of this documented note.    NAZ Yanez

## 2025-06-19 DIAGNOSIS — F41.9 ANXIETY: ICD-10-CM

## 2025-06-19 DIAGNOSIS — F32.A DEPRESSION, UNSPECIFIED DEPRESSION TYPE: ICD-10-CM

## 2025-06-19 DIAGNOSIS — F31.32 BIPOLAR AFFECTIVE DISORDER, CURRENTLY DEPRESSED, MODERATE: Primary | ICD-10-CM

## 2025-07-10 ENCOUNTER — OFFICE VISIT (OUTPATIENT)
Dept: FAMILY MEDICINE CLINIC | Facility: CLINIC | Age: 25
End: 2025-07-10
Payer: COMMERCIAL

## 2025-07-10 ENCOUNTER — TELEPHONE (OUTPATIENT)
Dept: FAMILY MEDICINE CLINIC | Facility: CLINIC | Age: 25
End: 2025-07-10

## 2025-07-10 VITALS
SYSTOLIC BLOOD PRESSURE: 124 MMHG | OXYGEN SATURATION: 100 % | WEIGHT: 227.4 LBS | BODY MASS INDEX: 41.85 KG/M2 | DIASTOLIC BLOOD PRESSURE: 82 MMHG | HEART RATE: 92 BPM | HEIGHT: 62 IN

## 2025-07-10 DIAGNOSIS — F31.32 BIPOLAR AFFECTIVE DISORDER, CURRENTLY DEPRESSED, MODERATE: ICD-10-CM

## 2025-07-10 DIAGNOSIS — F41.9 ANXIETY: ICD-10-CM

## 2025-07-10 PROCEDURE — 1125F AMNT PAIN NOTED PAIN PRSNT: CPT

## 2025-07-10 PROCEDURE — 1160F RVW MEDS BY RX/DR IN RCRD: CPT

## 2025-07-10 PROCEDURE — 1159F MED LIST DOCD IN RCRD: CPT

## 2025-07-10 PROCEDURE — 99214 OFFICE O/P EST MOD 30 MIN: CPT

## 2025-07-10 RX ORDER — CYCLOBENZAPRINE HCL 10 MG
10 TABLET ORAL 3 TIMES DAILY PRN
COMMUNITY
Start: 2025-05-16

## 2025-07-10 RX ORDER — LURASIDONE HYDROCHLORIDE 20 MG/1
20 TABLET, FILM COATED ORAL DAILY
Qty: 60 TABLET | Refills: 0 | Status: SHIPPED | OUTPATIENT
Start: 2025-07-10

## 2025-07-10 NOTE — PROGRESS NOTES
Chief Complaint  Depression and mood disorder    SUBJECTIVE  Kyara Christianson presents to [facility]    History of Present Illness  Patient is a 24-year-old female presents today for follow-up on bipolar depression. Pt states she feels more irritated, mean, very snappy and is getting very overstimulated easily.Pt states it is causing her to smoke more when she is trying to stop smoking.    Last appt: She has a hx of bipolar disorder, MDD, DIANA,PTSD, ADHD. She grew up in residential facility. She has failed Zoloft, Lexapro, Wellbutrin, hydroxyzine and Buspar in the past. States most antidepressants caused increased anger and irritability. She had had increasing depression, low self-esteem, irritability, crying, anxiousness over the past few weeks. She is not having current SI but feels it is starting to go in that direction.     Interval follow up: Patient is currently taking Vraylar 1.5 mg that was started at last appt. Reports feeling increased irritability, anger and agitation since starting. She has been smoking more as well. Denies any self harm or suicidal ideation. She as an appt on 7/30/25 with psych. She also requests a SHARAN letter for her cat.     Past Medical History:   Diagnosis Date    Anxiety     Arthritis of back     Asthma     PRN INHALERS    CTS (carpal tunnel syndrome)     Possibly?    Depression     Fracture of wrist     GERD (gastroesophageal reflux disease)     Recurrent streptococcal pharyngitis       Family History   Problem Relation Age of Onset    Hypertension Mother     Mental illness Mother     Diabetes Mother     Kidney failure Mother     Depression Mother     Drug abuse Mother     Anxiety disorder Mother     Arthritis Mother     Hyperlipidemia Mother     Kidney disease Mother     Migraines Sister       Past Surgical History:   Procedure Laterality Date    NO PAST SURGERIES          Current Outpatient Medications:     cyclobenzaprine (FLEXERIL) 10 MG tablet,  "Take 1 tablet by mouth 3 (Three) Times a Day As Needed. for muscle spams, Disp: , Rfl:     metFORMIN ER (GLUCOPHAGE-XR) 500 MG 24 hr tablet, Take 1 tablet by mouth Daily With Breakfast., Disp: 90 tablet, Rfl: 0    nicotine (Nicoderm CQ) 7 MG/24HR patch, Place 1 patch on the skin as directed by provider Daily., Disp: 14 each, Rfl: 2    diclofenac (VOLTAREN) 50 MG EC tablet, Take 1 tablet by mouth Every 12 (Twelve) Hours. (Patient not taking: Reported on 7/10/2025), Disp: , Rfl:     docusate sodium (COLACE) 100 MG capsule, Take 1 capsule by mouth 3 (Three) Times a Day As Needed for Constipation. (Patient not taking: Reported on 7/10/2025), Disp: 50 capsule, Rfl: 0    doxycycline (VIBRAMYCIN) 100 MG capsule, Take 1 capsule by mouth 2 (Two) Times a Day. (Patient not taking: Reported on 7/10/2025), Disp: 10 capsule, Rfl: 0    Lurasidone HCl (Latuda) 20 MG tablet tablet, Take 1 tablet by mouth Daily., Disp: 60 tablet, Rfl: 0    OBJECTIVE  Vital Signs:   /82   Pulse 92   Ht 157.5 cm (62\")   Wt 103 kg (227 lb 6.4 oz)   LMP 06/12/2025 (Approximate)   SpO2 100%   BMI 41.59 kg/m²    Estimated body mass index is 41.59 kg/m² as calculated from the following:    Height as of this encounter: 157.5 cm (62\").    Weight as of this encounter: 103 kg (227 lb 6.4 oz).     Wt Readings from Last 3 Encounters:   07/10/25 103 kg (227 lb 6.4 oz)   06/21/25 103 kg (228 lb)   06/18/25 104 kg (228 lb 9.6 oz)     BP Readings from Last 3 Encounters:   07/10/25 124/82   06/21/25 133/81   06/18/25 138/63       Physical Exam  Vitals reviewed.   Constitutional:       General: She is not in acute distress.     Appearance: She is not ill-appearing.   HENT:      Head: Normocephalic and atraumatic.   Eyes:      Conjunctiva/sclera: Conjunctivae normal.   Cardiovascular:      Rate and Rhythm: Normal rate and regular rhythm.      Heart sounds: Normal heart sounds.   Pulmonary:      Effort: Pulmonary effort is normal.      Breath sounds: Normal " breath sounds.   Musculoskeletal:      Cervical back: Normal range of motion.   Neurological:      Mental Status: She is alert and oriented to person, place, and time.   Psychiatric:         Mood and Affect: Mood is anxious and depressed.         Behavior: Behavior normal.         Thought Content: Thought content normal.         Judgment: Judgment normal.          Result Review    Common labs          11/30/2024    09:24 12/20/2024    12:33 5/2/2025    12:41   Common Labs   Glucose 97   83    BUN 8   12    Creatinine 0.67   0.72    Sodium 138   138    Potassium 3.9   3.8    Chloride 104   107    Calcium 8.8   9.0    Albumin 4.1   4.3    Total Bilirubin 0.4   0.3    Alkaline Phosphatase 61   59    AST (SGOT) 13   17    ALT (SGPT) 15   17    WBC 13.91  9.78  9.87    Hemoglobin 14.7  15.0  15.7    Hematocrit 41.9  43.3  44.9    Platelets 297  331  315    Total Cholesterol   133    Triglycerides   79    HDL Cholesterol   35    LDL Cholesterol    82    Hemoglobin A1C   5.00        No Images in the past 120 days found..      The above data has been reviewed by NAZ Yanez 07/10/2025 10:57 EDT.          Patient Care Team:  Luann Gallego APRN as PCP - General (Nurse Practitioner)            ASSESSMENT & PLAN    Diagnoses and all orders for this visit:    1. Bipolar affective disorder, currently depressed, moderate  Comments:  start latuda 20 mg, follow up with psych as scheduled in 10 days, genesight collected in office  Orders:  -     Discontinue: Cariprazine HCl (Vraylar) 3 MG capsule capsule; Take 1 capsule by mouth Daily.  Dispense: 30 capsule; Refill: 0  -     Gene[employer]ight - Swab,; Future  -     Lurasidone HCl (Latuda) 20 MG tablet tablet; Take 1 tablet by mouth Daily.  Dispense: 60 tablet; Refill: 0    2. Anxiety  Comments:  genesight collected in office  Orders:  -     Discontinue: Cariprazine HCl (Vraylar) 3 MG capsule capsule; Take 1 capsule by mouth Daily.  Dispense: 30 capsule; Refill: 0  -     [facility] -  Swab,; Future  -     Lurasidone HCl (Latuda) 20 MG tablet tablet; Take 1 tablet by mouth Daily.  Dispense: 60 tablet; Refill: 0         Tobacco Use: High Risk (7/10/2025)    Patient History     Smoking Tobacco Use: Every Day     Smokeless Tobacco Use: Never     Passive Exposure: Current       Follow Up     Return if symptoms worsen or fail to improve, keep appt in 10 days with psych.      Patient was given instructions and counseling regarding her condition or for health maintenance advice. Please see specific information pulled into the AVS if appropriate.   I have reviewed information obtained and documented by others and I have confirmed the accuracy of this documented note.    Luann Gallego, APRN

## 2025-07-10 NOTE — TELEPHONE ENCOUNTER
Patient dropped off an accommodations form for her apartment. She is wanting Luann to fill it out for her and give her a call back once it's filled out.

## 2025-07-10 NOTE — LETTER
"        [facility]  [address]  [address]  [place] [address]  [phone]  Dept: [phone]    07/10/25    RE:   Patient Name:  Kyara Christianson   :  2000      Dear :  Osorio of Kyara Sparrow is my patient, and has been under my care since 2024.  I am familiar with her history and with the functional limitations imposed by her disability.  She meets the definition of disability under the Americans with Disabilities Act, the Fair Housing Act, and the Rehabilitation Act of 1973.     Due to mental illness, Kyara has certain limitations regarding emotional regulation.  In order to help alleviate these difficulties, and to enhance his/her ability to live independently and to fully use and enjoy the dwelling unit you own and/or administer, I am prescribing an emotional support animal (1 cat) that will assist Kyara in coping with her disability.    I would be happy to answer other questions you may have concerning my recommendation that Kyara have an emotional support animal.  Should you have additional questions, please do not hesitate to contact me.    Sincerely,  NAZ Youngblood        This document signed by Tato YOUNGBLOOD 10, 2025 11:20 EDT\"    "

## 2025-07-23 ENCOUNTER — TELEPHONE (OUTPATIENT)
Dept: FAMILY MEDICINE CLINIC | Facility: CLINIC | Age: 25
End: 2025-07-23
Payer: COMMERCIAL

## 2025-07-23 NOTE — TELEPHONE ENCOUNTER
"Relay     \"Left message for pt stating that her genesCodementor results are in and she needs to make appt w PCP. Can be telehealth.\"                "

## 2025-07-30 ENCOUNTER — TELEMEDICINE (OUTPATIENT)
Dept: PSYCHIATRY | Facility: CLINIC | Age: 25
End: 2025-07-30
Payer: COMMERCIAL

## 2025-07-30 DIAGNOSIS — F33.1 MODERATE EPISODE OF RECURRENT MAJOR DEPRESSIVE DISORDER: Primary | ICD-10-CM

## 2025-07-30 DIAGNOSIS — F39 MOOD DISORDER: ICD-10-CM

## 2025-07-30 DIAGNOSIS — F41.1 GAD (GENERALIZED ANXIETY DISORDER): ICD-10-CM

## 2025-07-30 DIAGNOSIS — R45.1 AGITATION: ICD-10-CM

## 2025-07-30 DIAGNOSIS — Z79.899 MEDICATION MANAGEMENT: ICD-10-CM

## 2025-07-30 DIAGNOSIS — R41.840 ATTENTION OR CONCENTRATION DEFICIT: ICD-10-CM

## 2025-07-30 RX ORDER — LAMOTRIGINE 25 MG/1
TABLET ORAL
Qty: 60 TABLET | Refills: 1 | Status: SHIPPED | OUTPATIENT
Start: 2025-07-30

## 2025-07-30 NOTE — PROGRESS NOTES
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 Non-Pharmacological treatment options discussed during today's visit. Patient/Guardian acknowledged and verbally consented with current treatment plan and was educated on the importance of compliance with treatment and follow-up appointments.      MEDICATION ISSUES:  Discussed medication options and treatment plan of prescribed medication as well as the risks, benefits, any black box warnings, and side effects including potential falls, possible impaired driving, and metabolic adversities among others. Patient is agreeable to call the office with any worsening of symptoms or onset of side effects, or if any concerns or questions arise.  The contact information for the office is made available to the patient. Patient is agreeable to call 911 or go to the nearest ER should they begin having any SI/HI, or if any urgent concerns arise. No medication side effects or related complaints today.     MEDS ORDERED DURING VISIT:  New Medications Ordered This Visit   Medications    lamoTRIgine (LaMICtal) 25 MG tablet     Sig: Take 1 tab by mouth daily x 2 weeks then increase to 2 tabs by mouth daily thereafter.     Dispense:  60 tablet     Refill:  1       Follow Up Appointment:   Return in about 4 weeks (around 8/27/2025) for Recheck.         Patient or patient  representative verbalized consent for the use of Ambient Listening during the visit with  NAZ Rodriguez for chart documentation. 7/30/2025  13:32 EDT      This document has been electronically signed by NAZ Rodriguez  July 30, 2025 14:21 EDT    Dictated Utilizing Dragon Dictation: Part of this note may be an electronic transcription/translation of spoken language to printed text using the Dragon Dictation System.    [phone]

## 2025-08-14 ENCOUNTER — TELEPHONE (OUTPATIENT)
Dept: FAMILY MEDICINE CLINIC | Facility: CLINIC | Age: 25
End: 2025-08-14

## 2025-08-15 ENCOUNTER — OFFICE VISIT (OUTPATIENT)
Dept: FAMILY MEDICINE CLINIC | Facility: CLINIC | Age: 25
End: 2025-08-15
Payer: COMMERCIAL

## 2025-08-15 VITALS
HEART RATE: 89 BPM | DIASTOLIC BLOOD PRESSURE: 86 MMHG | SYSTOLIC BLOOD PRESSURE: 117 MMHG | WEIGHT: 224 LBS | BODY MASS INDEX: 39.69 KG/M2 | OXYGEN SATURATION: 99 % | HEIGHT: 63 IN

## 2025-08-15 DIAGNOSIS — E66.812 CLASS 2 OBESITY DUE TO EXCESS CALORIES WITHOUT SERIOUS COMORBIDITY WITH BODY MASS INDEX (BMI) OF 39.0 TO 39.9 IN ADULT: ICD-10-CM

## 2025-08-15 DIAGNOSIS — M77.8 TENDONITIS OF WRIST, LEFT: Primary | ICD-10-CM

## 2025-08-15 DIAGNOSIS — E66.09 CLASS 2 OBESITY DUE TO EXCESS CALORIES WITHOUT SERIOUS COMORBIDITY WITH BODY MASS INDEX (BMI) OF 39.0 TO 39.9 IN ADULT: ICD-10-CM

## 2025-08-15 RX ORDER — ORLISTAT 120 MG/1
120 CAPSULE ORAL
Qty: 90 CAPSULE | Refills: 2 | Status: SHIPPED | OUTPATIENT
Start: 2025-08-15

## 2025-08-15 RX ORDER — METHYLPREDNISOLONE 4 MG/1
TABLET ORAL
Qty: 21 TABLET | Refills: 0 | Status: SHIPPED | OUTPATIENT
Start: 2025-08-15

## 2025-08-29 ENCOUNTER — OFFICE VISIT (OUTPATIENT)
Dept: FAMILY MEDICINE CLINIC | Facility: CLINIC | Age: 25
End: 2025-08-29
Payer: COMMERCIAL

## 2025-08-29 ENCOUNTER — HOSPITAL ENCOUNTER (OUTPATIENT)
Dept: GENERAL RADIOLOGY | Facility: HOSPITAL | Age: 25
Discharge: HOME | End: 2025-08-29
Payer: COMMERCIAL

## 2025-08-29 VITALS
SYSTOLIC BLOOD PRESSURE: 122 MMHG | BODY MASS INDEX: 40.04 KG/M2 | DIASTOLIC BLOOD PRESSURE: 77 MMHG | HEART RATE: 94 BPM | HEIGHT: 63 IN | OXYGEN SATURATION: 99 % | WEIGHT: 226 LBS

## 2025-08-29 DIAGNOSIS — M79.642 LEFT HAND PAIN: Primary | ICD-10-CM

## 2025-08-29 DIAGNOSIS — M79.642 LEFT HAND PAIN: ICD-10-CM

## 2025-08-29 DIAGNOSIS — M25.532 LEFT WRIST PAIN: ICD-10-CM

## 2025-08-29 PROCEDURE — 99213 OFFICE O/P EST LOW 20 MIN: CPT

## 2025-08-29 PROCEDURE — 1125F AMNT PAIN NOTED PAIN PRSNT: CPT

## 2025-08-29 PROCEDURE — 1159F MED LIST DOCD IN RCRD: CPT

## 2025-08-29 PROCEDURE — 73130 X-RAY EXAM OF HAND: CPT

## 2025-08-29 PROCEDURE — 1160F RVW MEDS BY RX/DR IN RCRD: CPT

## 2025-08-29 RX ORDER — MELOXICAM 7.5 MG/1
7.5 TABLET ORAL DAILY
Qty: 60 TABLET | Refills: 0 | Status: SHIPPED | OUTPATIENT
Start: 2025-08-29